# Patient Record
Sex: MALE | Race: WHITE | NOT HISPANIC OR LATINO | Employment: FULL TIME | URBAN - METROPOLITAN AREA
[De-identification: names, ages, dates, MRNs, and addresses within clinical notes are randomized per-mention and may not be internally consistent; named-entity substitution may affect disease eponyms.]

---

## 2017-01-25 ENCOUNTER — ALLSCRIPTS OFFICE VISIT (OUTPATIENT)
Dept: OTHER | Facility: OTHER | Age: 47
End: 2017-01-25

## 2017-01-25 DIAGNOSIS — E78.1 PURE HYPERGLYCERIDEMIA: ICD-10-CM

## 2018-01-12 VITALS
TEMPERATURE: 98.8 F | HEART RATE: 76 BPM | RESPIRATION RATE: 16 BRPM | WEIGHT: 228.38 LBS | SYSTOLIC BLOOD PRESSURE: 120 MMHG | DIASTOLIC BLOOD PRESSURE: 70 MMHG

## 2018-01-14 NOTE — RESULT NOTES
Verified Results  (1) LIPID PANEL, FASTING 03Eqf0100 03:58PM Sana Costa Order Number: [id number]     Test Name Result Flag Reference   CHOLESTEROL 242 mg/dL H    HDL,DIRECT 31 mg/dL L 40-60   Specimen collection should occur prior to Metamizole administration due to the potential for falsely depressed results  LDL CHOLESTEROL CALCULATED (Report)  0-100   Calculated LDL invalid, triglycerides >400 mg/dl  Performing Comments: OK IF NON FASTING  THIS IS NEEDED FOR PREOPERATIVE CLEARANCE  - Patient Instructions: This is a fasting blood test  Water,black tea or black coffee only after 9:00pm the night before test   Drink 2 glasses of water the morning of test     Performing Comments: OK IF NON FASTING  THIS IS NEEDED FOR PREOPERATIVE CLEARANCE  - Patient Instructions: This is a fasting blood test  Water,black tea or black coffee only after 9:00pm the night before test Drink 2 glasses of water the morning of   test   Triglyceride:       Normal       <150 mg/dl     Borderline High  150-199 mg/dl     High        200-499 mg/dl     Very High     >499 mg/dl  Cholesterol:       Desirable    <200 mg/dl    Borderline High 200-239 mg/dl    High       >239 mg/dl  HDL Cholesterol:      High  >59 mg/dL    Low   <41 mg/dL   Calculated LDL invalid, triglycerides >400 mg/dl  Performing Comments: OK IF NON FASTING  THIS IS NEEDED FOR PREOPERATIVE CLEARANCE   - Patient Instructions: This is a fasting blood test  Water,black tea or black  coffee only after 9:00pm the night before test   Drink 2 glasses of water the morning of test     Performing Comments: OK IF NON FASTING  THIS IS NEEDED FOR PREOPERATIVE CLEARANCE   - Patient Instructions:  This is a fasting blood test  Water,black tea or black  coffee only after 9:00pm the night before test Drink 2 glasses of water the morning of   test   Triglyceride:         Normal              <150 mg/dl       Borderline High    150-199 mg/dl       High               200-499 mg/dl       Very High          >499 mg/dl  Cholesterol:         Desirable        <200 mg/dl      Borderline High  200-239 mg/dl      High             >239 mg/dl  HDL Cholesterol:        High    >59 mg/dL      Low     <41 mg/dL   TRIGLYCERIDES 680 mg/dL H <=150   Specimen collection should occur prior to N-Acetylcysteine or Metamizole administration due to the potential for falsely depressed results

## 2018-02-02 ENCOUNTER — APPOINTMENT (OUTPATIENT)
Dept: RADIOLOGY | Facility: MEDICAL CENTER | Age: 48
End: 2018-02-02
Payer: COMMERCIAL

## 2018-02-02 ENCOUNTER — OFFICE VISIT (OUTPATIENT)
Dept: URGENT CARE | Facility: MEDICAL CENTER | Age: 48
End: 2018-02-02
Payer: COMMERCIAL

## 2018-02-02 ENCOUNTER — TRANSCRIBE ORDERS (OUTPATIENT)
Dept: ADMINISTRATIVE | Facility: HOSPITAL | Age: 48
End: 2018-02-02

## 2018-02-02 VITALS
TEMPERATURE: 99.2 F | WEIGHT: 207.38 LBS | DIASTOLIC BLOOD PRESSURE: 90 MMHG | RESPIRATION RATE: 20 BRPM | HEIGHT: 67 IN | OXYGEN SATURATION: 96 % | HEART RATE: 90 BPM | SYSTOLIC BLOOD PRESSURE: 160 MMHG | BODY MASS INDEX: 32.55 KG/M2

## 2018-02-02 DIAGNOSIS — J40 BRONCHITIS: Primary | ICD-10-CM

## 2018-02-02 DIAGNOSIS — R68.89 FLU-LIKE SYMPTOMS: Primary | ICD-10-CM

## 2018-02-02 PROCEDURE — 99213 OFFICE O/P EST LOW 20 MIN: CPT

## 2018-02-02 PROCEDURE — G0382 LEV 3 HOSP TYPE B ED VISIT: HCPCS

## 2018-02-02 PROCEDURE — 87798 DETECT AGENT NOS DNA AMP: CPT

## 2018-02-02 PROCEDURE — S9088 SERVICES PROVIDED IN URGENT: HCPCS

## 2018-02-02 PROCEDURE — 71046 X-RAY EXAM CHEST 2 VIEWS: CPT

## 2018-02-02 RX ORDER — OSELTAMIVIR PHOSPHATE 75 MG/1
75 CAPSULE ORAL 2 TIMES DAILY
Qty: 10 CAPSULE | Refills: 0 | Status: SHIPPED | OUTPATIENT
Start: 2018-02-02 | End: 2018-02-02 | Stop reason: SDUPTHER

## 2018-02-02 RX ORDER — OSELTAMIVIR PHOSPHATE 75 MG/1
75 CAPSULE ORAL 2 TIMES DAILY
Qty: 10 CAPSULE | Refills: 0 | Status: SHIPPED | OUTPATIENT
Start: 2018-02-02 | End: 2018-02-07

## 2018-02-02 NOTE — PROGRESS NOTES
On antibiotics for bronchitis today is last day for meds  Started yesterday with fever, nausea, feeling tired and achy  Temp yesterday was 101 at home  Taking homeopathic meds

## 2018-02-02 NOTE — PROGRESS NOTES
Assessment/Plan:      Diagnoses and all orders for this visit:    Flu-like symptoms          Subjective:     Patient ID: Diane Santos is a 41 y o  male  URI    This is a new problem  The current episode started yesterday  The problem has been unchanged  The maximum temperature recorded prior to his arrival was 100 4 - 100 9 F  Associated symptoms include chest pain, congestion, coughing, headaches, a plugged ear sensation, a rash, rhinorrhea and sinus pain  Pertinent negatives include no abdominal pain, diarrhea, dysuria, ear pain, nausea, sneezing, sore throat, swollen glands, vomiting or wheezing  Joint swelling: cough  He has tried nothing for the symptoms  The treatment provided mild relief  Review of Systems   Constitutional: Negative for chills, fatigue and fever  HENT: Positive for congestion, rhinorrhea and sinus pain  Negative for ear pain, hearing loss, postnasal drip, sinus pressure, sneezing and sore throat  Eyes: Negative for pain and discharge  Respiratory: Positive for cough  Negative for chest tightness, shortness of breath and wheezing  Cardiovascular: Positive for chest pain  Gastrointestinal: Negative for abdominal pain, constipation, diarrhea, nausea and vomiting  Genitourinary: Negative for difficulty urinating and dysuria  Musculoskeletal: Negative for arthralgias and myalgias  Skin: Positive for rash  Neurological: Positive for headaches  Negative for dizziness  Psychiatric/Behavioral: Negative for behavioral problems  Objective:  Vitals:    02/02/18 1128   BP: 160/90   Pulse: 90   Resp: 20   Temp: 99 2 °F (37 3 °C)   SpO2: 96%       X-RAY  Chest x-ray  I personally reviewed X-ray  No active pulmonary disease  Physical Exam   Constitutional: He is oriented to person, place, and time  He appears well-developed and well-nourished  HENT:   Right Ear: Tympanic membrane normal    Left Ear: Tympanic membrane normal    Neck: Normal range of motion   No edema present  Cardiovascular: Regular rhythm, S1 normal and S2 normal     Pulmonary/Chest: Effort normal and breath sounds normal  No respiratory distress  Lymphadenopathy:     He has no cervical adenopathy  Neurological: He is alert and oriented to person, place, and time  Skin: Skin is warm, dry and intact  No rash noted  Psychiatric: He has a normal mood and affect  His speech is normal and behavior is normal    Nursing note and vitals reviewed

## 2018-02-02 NOTE — PATIENT INSTRUCTIONS
Take tamiflu as directed  Increase fluid intake as directed  Follow up with PCP in 1-2 days  Go to the ER for worsening symptoms  Influenza   AMBULATORY CARE:   Influenza  (the flu) is an infection caused by the influenza virus  The flu is easily spread when an infected person coughs, sneezes, or has close contact with others  You may be able to spread the flu to others for 1 week or longer after signs or symptoms appear  Common signs and symptoms include the following:   · Fever and chills    · Headaches, body aches, and muscle or joint pain    · Cough, runny nose, and sore throat    · Loss of appetite, nausea, vomiting, or diarrhea    · Tiredness    · Trouble breathing  Call 911 for any of the following:   · You have trouble breathing, and your lips look purple or blue  · You have a seizure  Seek care immediately if:   · You are dizzy, or you are urinating less or not at all  · You have a headache with a stiff neck, and you feel tired or confused  · You have new pain or pressure in your chest     · Your symptoms, such as shortness of breath, vomiting, or diarrhea, get worse  · Your symptoms, such as fever and coughing, seem to get better, but then get worse  Contact your healthcare provider if:   · You have new muscle pain or weakness  · You have questions or concerns about your condition or care  Treatment for influenza  may include any of the following:  · Acetaminophen  decreases pain and fever  It is available without a doctor's order  Ask how much to take and how often to take it  Follow directions  Acetaminophen can cause liver damage if not taken correctly  · NSAIDs , such as ibuprofen, help decrease swelling, pain, and fever  This medicine is available with or without a doctor's order  NSAIDs can cause stomach bleeding or kidney problems in certain people  If you take blood thinner medicine, always ask your healthcare provider if NSAIDs are safe for you   Always read the medicine label and follow directions  · Antivirals  help fight a viral infection  Manage your symptoms:   · Rest  as much as you can to help you recover  · Drink liquids as directed  to help prevent dehydration  Ask how much liquid to drink each day and which liquids are best for you  Prevent the spread of the flu:   · Wash your hands often  Use soap and water  Wash your hands after you use the bathroom, change a child's diapers, or sneeze  Wash your hands before you prepare or eat food  Use gel hand cleanser when soap and water are not available  Do not touch your eyes, nose, or mouth unless you have washed your hands first            · Cover your mouth when you sneeze or cough  Cough into a tissue or the bend of your arm  · Clean shared items with a germ-killing   Clean table surfaces, doorknobs, and light switches  Do not share towels, silverware, and dishes with people who are sick  Wash bed sheets, towels, silverware, and dishes with soap and water  · Wear a mask  over your mouth and nose if you are sick or are near anyone who is sick  · Stay away from others  if you are sick  · Influenza vaccine  helps prevent influenza (flu)  Everyone older than 6 months should get a yearly influenza vaccine  Get the vaccine as soon as it is available, usually in September or October each year  Follow up with your healthcare provider as directed:  Write down your questions so you remember to ask them during your visits  © 2017 [employer] Information is for End User's use only and may not be sold, redistributed or otherwise used for commercial purposes  All illustrations and images included in CareNotes® are the copyrighted property of [employer]  or [employer]  The above information is an  only  It is not intended as medical advice for individual conditions or treatments   Talk to your doctor, nurse or pharmacist before following any medical regimen to see if it is safe and effective for you

## 2018-02-03 LAB
FLUAV AG SPEC QL: NORMAL
FLUBV AG SPEC QL: NORMAL
RSV B RNA SPEC QL NAA+PROBE: NORMAL

## 2018-02-06 ENCOUNTER — TRANSCRIBE ORDERS (OUTPATIENT)
Dept: ADMINISTRATIVE | Facility: HOSPITAL | Age: 48
End: 2018-02-06

## 2018-02-06 ENCOUNTER — APPOINTMENT (OUTPATIENT)
Dept: LAB | Facility: MEDICAL CENTER | Age: 48
End: 2018-02-06
Payer: COMMERCIAL

## 2018-02-06 ENCOUNTER — APPOINTMENT (OUTPATIENT)
Dept: RADIOLOGY | Facility: MEDICAL CENTER | Age: 48
End: 2018-02-06
Payer: COMMERCIAL

## 2018-02-06 DIAGNOSIS — R50.9 FEVER OF UNKNOWN ORIGIN: Primary | ICD-10-CM

## 2018-02-06 LAB
ALBUMIN SERPL BCP-MCNC: 4 G/DL (ref 3.5–5)
ALP SERPL-CCNC: 72 U/L (ref 46–116)
ALT SERPL W P-5'-P-CCNC: 35 U/L (ref 12–78)
ANION GAP SERPL CALCULATED.3IONS-SCNC: 5 MMOL/L (ref 4–13)
AST SERPL W P-5'-P-CCNC: 22 U/L (ref 5–45)
BACTERIA UR QL AUTO: ABNORMAL /HPF
BASOPHILS # BLD AUTO: 0.01 THOUSANDS/ΜL (ref 0–0.1)
BASOPHILS NFR BLD AUTO: 0 % (ref 0–1)
BILIRUB SERPL-MCNC: 0.63 MG/DL (ref 0.2–1)
BILIRUB UR QL STRIP: NEGATIVE
BUN SERPL-MCNC: 19 MG/DL (ref 5–25)
CALCIUM SERPL-MCNC: 9 MG/DL (ref 8.3–10.1)
CHLORIDE SERPL-SCNC: 105 MMOL/L (ref 100–108)
CLARITY UR: ABNORMAL
CO2 SERPL-SCNC: 29 MMOL/L (ref 21–32)
COLOR UR: ABNORMAL
CREAT SERPL-MCNC: 0.9 MG/DL (ref 0.6–1.3)
EOSINOPHIL # BLD AUTO: 0.14 THOUSAND/ΜL (ref 0–0.61)
EOSINOPHIL NFR BLD AUTO: 2 % (ref 0–6)
ERYTHROCYTE [DISTWIDTH] IN BLOOD BY AUTOMATED COUNT: 14.1 % (ref 11.6–15.1)
GFR SERPL CREATININE-BSD FRML MDRD: 103 ML/MIN/1.73SQ M
GLUCOSE SERPL-MCNC: 71 MG/DL (ref 65–140)
GLUCOSE UR STRIP-MCNC: NEGATIVE MG/DL
HCT VFR BLD AUTO: 48.3 % (ref 36.5–49.3)
HGB BLD-MCNC: 15.9 G/DL (ref 12–17)
HGB UR QL STRIP.AUTO: NEGATIVE
HYALINE CASTS #/AREA URNS LPF: ABNORMAL /LPF
KETONES UR STRIP-MCNC: NEGATIVE MG/DL
LEUKOCYTE ESTERASE UR QL STRIP: NEGATIVE
LYMPHOCYTES # BLD AUTO: 1.44 THOUSANDS/ΜL (ref 0.6–4.47)
LYMPHOCYTES NFR BLD AUTO: 22 % (ref 14–44)
MCH RBC QN AUTO: 29.3 PG (ref 26.8–34.3)
MCHC RBC AUTO-ENTMCNC: 32.9 G/DL (ref 31.4–37.4)
MCV RBC AUTO: 89 FL (ref 82–98)
MONOCYTES # BLD AUTO: 0.45 THOUSAND/ΜL (ref 0.17–1.22)
MONOCYTES NFR BLD AUTO: 7 % (ref 4–12)
NEUTROPHILS # BLD AUTO: 4.58 THOUSANDS/ΜL (ref 1.85–7.62)
NEUTS SEG NFR BLD AUTO: 69 % (ref 43–75)
NITRITE UR QL STRIP: NEGATIVE
NON-SQ EPI CELLS URNS QL MICRO: ABNORMAL /HPF
NRBC BLD AUTO-RTO: 0 /100 WBCS
PH UR STRIP.AUTO: 6 [PH] (ref 4.5–8)
PLATELET # BLD AUTO: 225 THOUSANDS/UL (ref 149–390)
PMV BLD AUTO: 10.9 FL (ref 8.9–12.7)
POTASSIUM SERPL-SCNC: 3.8 MMOL/L (ref 3.5–5.3)
PROT SERPL-MCNC: 8 G/DL (ref 6.4–8.2)
PROT UR STRIP-MCNC: ABNORMAL MG/DL
RBC # BLD AUTO: 5.42 MILLION/UL (ref 3.88–5.62)
RBC #/AREA URNS AUTO: ABNORMAL /HPF
SODIUM SERPL-SCNC: 139 MMOL/L (ref 136–145)
SP GR UR STRIP.AUTO: 1.03 (ref 1–1.03)
UROBILINOGEN UR QL STRIP.AUTO: 1 E.U./DL
WBC # BLD AUTO: 6.63 THOUSAND/UL (ref 4.31–10.16)
WBC #/AREA URNS AUTO: ABNORMAL /HPF

## 2018-02-06 PROCEDURE — 85025 COMPLETE CBC W/AUTO DIFF WBC: CPT | Performed by: FAMILY MEDICINE

## 2018-02-06 PROCEDURE — 87086 URINE CULTURE/COLONY COUNT: CPT | Performed by: FAMILY MEDICINE

## 2018-02-06 PROCEDURE — 81001 URINALYSIS AUTO W/SCOPE: CPT | Performed by: FAMILY MEDICINE

## 2018-02-06 PROCEDURE — 87040 BLOOD CULTURE FOR BACTERIA: CPT | Performed by: FAMILY MEDICINE

## 2018-02-06 PROCEDURE — 36415 COLL VENOUS BLD VENIPUNCTURE: CPT | Performed by: FAMILY MEDICINE

## 2018-02-06 PROCEDURE — 71046 X-RAY EXAM CHEST 2 VIEWS: CPT

## 2018-02-06 PROCEDURE — 80053 COMPREHEN METABOLIC PANEL: CPT | Performed by: FAMILY MEDICINE

## 2018-02-07 LAB — BACTERIA UR CULT: NORMAL

## 2018-02-12 LAB — BACTERIA BLD CULT: NORMAL

## 2019-09-17 ENCOUNTER — OFFICE VISIT (OUTPATIENT)
Dept: UROLOGY | Facility: AMBULATORY SURGERY CENTER | Age: 49
End: 2019-09-17
Payer: COMMERCIAL

## 2019-09-17 VITALS
SYSTOLIC BLOOD PRESSURE: 122 MMHG | WEIGHT: 215 LBS | HEIGHT: 67 IN | BODY MASS INDEX: 33.74 KG/M2 | DIASTOLIC BLOOD PRESSURE: 88 MMHG | HEART RATE: 58 BPM

## 2019-09-17 DIAGNOSIS — Z30.2 ENCOUNTER FOR STERILIZATION: Primary | ICD-10-CM

## 2019-09-17 PROCEDURE — 99244 OFF/OP CNSLTJ NEW/EST MOD 40: CPT | Performed by: UROLOGY

## 2019-09-17 RX ORDER — LORAZEPAM 2 MG/1
2 TABLET ORAL ONCE
Qty: 1 TABLET | Refills: 0 | Status: SHIPPED | OUTPATIENT
Start: 2019-09-17 | End: 2020-08-20

## 2019-09-17 RX ORDER — CEPHALEXIN 500 MG/1
1000 CAPSULE ORAL ONCE
Qty: 2 CAPSULE | Refills: 0 | Status: SHIPPED | OUTPATIENT
Start: 2019-09-17 | End: 2019-09-17

## 2019-09-17 RX ORDER — COAL TAR 20 %
SOLUTION, NON-ORAL TOPICAL
COMMUNITY
Start: 2017-01-25 | End: 2021-02-05 | Stop reason: ALTCHOICE

## 2019-09-17 RX ORDER — CLOBETASOL PROPIONATE 0.5 MG/G
CREAM TOPICAL
Refills: 6 | COMMUNITY
Start: 2019-07-24 | End: 2021-11-26 | Stop reason: SDUPTHER

## 2019-09-17 NOTE — ASSESSMENT & PLAN NOTE
Overall impression: Desires sterility    We discussed the risks of vasectomy including but not limited to infection, bleeding, damage to testicles, and possible continued fertility  We discussed that reversal is expensive, out-of-pocket and successful 80% of the time  The patient to proceed  Prescription for Ativan and Keflex given  Patient consented for procedure  He will need a ride home on the day of procedure  Procedure scheduled for the near future

## 2019-09-17 NOTE — PROGRESS NOTES
Assessment/Plan:    Encounter for sterilization    Overall impression: Desires sterility    We discussed the risks of vasectomy including but not limited to infection, bleeding, damage to testicles, and possible continued fertility  We discussed that reversal is expensive, out-of-pocket and successful 80% of the time  The patient to proceed  Prescription for Ativan and Keflex given  Patient consented for procedure  He will need a ride home on the day of procedure  Procedure scheduled for the near future  Diagnoses and all orders for this visit:    Encounter for sterilization  -     LORazepam (ATIVAN) 2 mg tablet; Take 1 tablet (2 mg total) by mouth once for 1 dose Take 1 hour prior to procedure  -     cephalexin (KEFLEX) 500 mg capsule; Take 2 capsules (1,000 mg total) by mouth once for 1 dose Take both pills 1 hour prior to procedure    Other orders  -     coal tar (LCD) 20 %; by Does not apply route  -     clobetasol (TEMOVATE) 0 05 % cream; APPLY THIN COAT TO AFFECTED AREA TWICE A DAY          Total visit time was 60 minutes of which over 50% was spent on counseling  Subjective:     Patient ID: Mai Crawley is a 57 y o  male    28-year-old male presents desiring sterility  He is  with 3 children  He denies any significant medical problems  He denies any allergies to medications  He denies any problems with coagulation  He has no other complaints  The following portions of the patient's history were reviewed and updated as appropriate: allergies, current medications, past family history, past medical history, past social history, past surgical history and problem list     Review of Systems   Constitutional: Negative  HENT: Negative  Eyes: Negative  Respiratory: Negative  Cardiovascular: Negative  Gastrointestinal: Negative  Endocrine: Negative  Genitourinary:        As noted per HPI   Musculoskeletal: Negative  Skin: Negative      Allergic/Immunologic: Negative  Neurological: Negative  Hematological: Negative  Psychiatric/Behavioral: Negative  Objective:    Physical Exam   Constitutional: He is oriented to person, place, and time  He appears well-developed and well-nourished  Neck: Normal range of motion  Cardiovascular: Intact distal pulses  Pulmonary/Chest: Effort normal    Abdominal: Soft  Bowel sounds are normal  He exhibits no distension and no mass  There is no tenderness  There is no rebound and no guarding  Genitourinary: Penis normal    Genitourinary Comments: Vasa easily palpable bilaterally   Musculoskeletal: Normal range of motion  Neurological: He is alert and oriented to person, place, and time  Skin: Skin is warm and dry  Psychiatric: He has a normal mood and affect  Vitals reviewed          Results  No results found for: PSA  Lab Results   Component Value Date    GLUCOSE 97 05/13/2014    CALCIUM 9 0 02/06/2018     05/13/2014    K 3 8 02/06/2018    CO2 29 02/06/2018     02/06/2018    BUN 19 02/06/2018    CREATININE 0 90 02/06/2018     Lab Results   Component Value Date    WBC 6 63 02/06/2018    HGB 15 9 02/06/2018    HCT 48 3 02/06/2018    MCV 89 02/06/2018     02/06/2018       No results found for this or any previous visit (from the past 1 hour(s)) ]

## 2019-11-14 ENCOUNTER — PROCEDURE VISIT (OUTPATIENT)
Dept: UROLOGY | Facility: AMBULATORY SURGERY CENTER | Age: 49
End: 2019-11-14
Payer: COMMERCIAL

## 2019-11-14 VITALS
BODY MASS INDEX: 33.74 KG/M2 | WEIGHT: 215 LBS | HEART RATE: 74 BPM | SYSTOLIC BLOOD PRESSURE: 154 MMHG | HEIGHT: 67 IN | DIASTOLIC BLOOD PRESSURE: 74 MMHG

## 2019-11-14 DIAGNOSIS — Z30.2 ENCOUNTER FOR STERILIZATION: Primary | ICD-10-CM

## 2019-11-14 PROCEDURE — 55250 REMOVAL OF SPERM DUCT(S): CPT | Performed by: UROLOGY

## 2019-11-14 PROCEDURE — 88302 TISSUE EXAM BY PATHOLOGIST: CPT | Performed by: PATHOLOGY

## 2019-11-14 RX ORDER — CEPHALEXIN 500 MG/1
CAPSULE ORAL
Refills: 0 | COMMUNITY
Start: 2019-11-08 | End: 2020-08-20

## 2019-11-14 RX ORDER — HYDROCODONE BITARTRATE AND ACETAMINOPHEN 5; 325 MG/1; MG/1
1 TABLET ORAL EVERY 4 HOURS PRN
Qty: 10 TABLET | Refills: 0 | Status: SHIPPED | OUTPATIENT
Start: 2019-11-14 | End: 2019-11-24

## 2019-11-14 NOTE — PROGRESS NOTES
Vasectomy     Date/Time 11/14/2019 2:36 PM     Performed by  Olamide Romo MD     Authorized by Olamide Romo MD          No Scalpel Vasectomy Procedure Note    Indications: 57 y o   y o  male desiring permanent sterilization    Pre-operative Diagnosis: Undesired fertility    Post-operative Diagnosis: Undesired fertility    Anesthesia: Lidocaine 2% without epinephrine      Procedure Details     The risks and benefits of the procedure were discussed at the pre-procedure consultation, and written, informed consent obtained  The patient took Ativan 2 mg and Keflex 60 minutes prior to procedure  The scrotum was palpated with both testes normal in size and position, no masses palpitated  The scrotum was cleansed with warm Betadine and draped in the usual sterile manner  2% lidocaine was instilled subcutaneously in the left hemiscrotum  After adequate anesthesia was established, a small perforation was made in the skin and the left vas was isolated with the ring forceps, dissected free and delivered through the skin perforation  The sheath of the vas was anesthetized with 2% lidocaine as well  The left vas was divided, approximately 1 5 cm portion removed, and each end of the vas was cauterized and suture ligated  The ends of the vas were replaced in the scrotum through the puncture site  The skin was reapproximated with an interrupted 3-0 chromic suture  Vasectomy was then performed on the right side in identical fashion  Midportions removed were sent to pathology to confirm  Good hemostasis was noted at the end of the procedure  Specimen:   1  Right vas deferens  2  Left vas deferens    Condition: Stable    Complications: none    Plan:        He did very well with the procedure today  Postprocedural instructions were provided  He will follow-up in 2-3 weeks for postoperative assessment   At that time we will coordinate his postprocedural semen analyses at 6 weeks, and again it 8 weeks after the procedure  He was instructed to call my office 2 weeks after his second specimen is submitted to review the results by phone  He was instructed to continue his current methods of contraception until that time

## 2019-11-26 NOTE — PROGRESS NOTES
11/29/2019  Piero Lopez is a 57 y o  male    Assessment/Plan  Wander Raja was seen today for post-op and vasectomy  Diagnoses and all orders for this visit:    Status post vasectomy  -     Semen analysis, post-vasectomy; Future  -     Semen analysis, post-vasectomy; Future        Discussion  Piero Lopez is a 57 y o  male being managed by Dr Benjamin Hadley  The patient is doing well with no complaints  He was provided verbal and written instructions regarding semen analysis testing  He was instructed to use contraception until sterility confirmed with 2 semen analysis  He will call to review results  He will follow up on an as needed basis  All questions were answered  History of Present Illness  57 y o  male s/p vasectomy (11/14/19), presents today for follow up  He denies any scrotal pain or swelling  He is doing well with no issues after surgery  Vitals  Vitals:    11/29/19 0925   BP: 142/88   BP Location: Left arm   Patient Position: Sitting   Cuff Size: Adult   Pulse: 68   Weight: 97 1 kg (214 lb)   Height: 5' 7" (1 702 m)       Current Medications  Current Outpatient Medications   Medication Sig Dispense Refill    clobetasol (TEMOVATE) 0 05 % cream APPLY THIN COAT TO AFFECTED AREA TWICE A DAY  6    cephalexin (KEFLEX) 500 mg capsule TAKE 2 CAPSULES BY MOUTH 1 HOUR PRIOR TO PROCEDURE  0    coal tar (LCD) 20 % by Does not apply route      LORazepam (ATIVAN) 2 mg tablet Take 1 tablet (2 mg total) by mouth once for 1 dose Take 1 hour prior to procedure  1 tablet 0     No current facility-administered medications for this visit  Review of Systems  Patient denies any gross hematuria, dysuria, or difficulty urinating      Physical Exam  Gu: scrotum midline/bilateral incisions c/d/i

## 2019-11-29 ENCOUNTER — OFFICE VISIT (OUTPATIENT)
Dept: UROLOGY | Facility: AMBULATORY SURGERY CENTER | Age: 49
End: 2019-11-29

## 2019-11-29 VITALS
WEIGHT: 214 LBS | DIASTOLIC BLOOD PRESSURE: 88 MMHG | HEART RATE: 68 BPM | BODY MASS INDEX: 33.59 KG/M2 | SYSTOLIC BLOOD PRESSURE: 142 MMHG | HEIGHT: 67 IN

## 2019-11-29 DIAGNOSIS — Z98.52 STATUS POST VASECTOMY: Primary | ICD-10-CM

## 2019-11-29 PROCEDURE — 99024 POSTOP FOLLOW-UP VISIT: CPT | Performed by: NURSE PRACTITIONER

## 2020-01-20 ENCOUNTER — APPOINTMENT (OUTPATIENT)
Dept: LAB | Facility: HOSPITAL | Age: 50
End: 2020-01-20
Payer: COMMERCIAL

## 2020-01-20 DIAGNOSIS — Z98.52 STATUS POST VASECTOMY: ICD-10-CM

## 2020-01-20 LAB
DEPRECATED CD4 CELLS/CD8 CELLS BLD: 1 ML
SPERM MOTILE SMN QL MICRO: NORMAL

## 2020-01-20 PROCEDURE — 89321 SEMEN ANAL SPERM DETECTION: CPT

## 2020-01-27 ENCOUNTER — TELEPHONE (OUTPATIENT)
Dept: UROLOGY | Facility: MEDICAL CENTER | Age: 50
End: 2020-01-27

## 2020-01-27 DIAGNOSIS — Z98.52 STATUS POST VASECTOMY: Primary | ICD-10-CM

## 2020-01-27 NOTE — TELEPHONE ENCOUNTER
Pt managed by [facility] calling for post vasectomy semen analysis results

## 2020-01-27 NOTE — TELEPHONE ENCOUNTER
Called and spoke with patient, aware we cannot consider him sterile until he has had 2 sperm analysis completed showing no mobile sperm  Patient only went for one test so far  Aware I have placed a new sperm analysis order in his chart that he can submit to the lab next week following the same protocol  Patient will then get a phone call after those results are in, to confirm if he is in fact considered sterile  All questions answered at this time

## 2020-02-07 ENCOUNTER — APPOINTMENT (OUTPATIENT)
Dept: LAB | Facility: HOSPITAL | Age: 50
End: 2020-02-07
Payer: COMMERCIAL

## 2020-02-07 DIAGNOSIS — Z98.52 STATUS POST VASECTOMY: ICD-10-CM

## 2020-02-07 LAB
DEPRECATED CD4 CELLS/CD8 CELLS BLD: 1.5 ML
SPERM MOTILE SMN QL MICRO: NORMAL

## 2020-02-07 PROCEDURE — 89321 SEMEN ANAL SPERM DETECTION: CPT

## 2020-02-17 NOTE — TELEPHONE ENCOUNTER
Called and left message per communication consent that patient is now considered sterile  Call office with any questions or concerns, number was left

## 2020-03-27 ENCOUNTER — TELEPHONE (OUTPATIENT)
Dept: DERMATOLOGY | Facility: CLINIC | Age: 50
End: 2020-03-27

## 2020-03-27 NOTE — TELEPHONE ENCOUNTER
Left msg for pt explaining he will need to contact medical records at the main hospital number to ask for his medical records

## 2020-07-13 ENCOUNTER — TELEPHONE (OUTPATIENT)
Dept: UROLOGY | Facility: AMBULATORY SURGERY CENTER | Age: 50
End: 2020-07-13

## 2020-07-13 NOTE — TELEPHONE ENCOUNTER
Call placed to patient, advised symptoms could be due to BPH  Patient recommended to schedule office visit to discuss  Patient scheduled

## 2020-07-13 NOTE — TELEPHONE ENCOUNTER
Report of urinary hesitancy likely secondary to underlying BPH  Patient can be scheduled for office visit to discuss

## 2020-07-13 NOTE — TELEPHONE ENCOUNTER
Spoke to patient directly and he stated that he only has hesitancy in the morning with urination  He voids well throughout the day  States he hydrates well  Denies any abdominal or penile pain  Denies any hematuria, no hematospermia noted, nor painful ejaculations  What do you suggest for him to help with having to "force" a m  Urination out?

## 2020-07-13 NOTE — TELEPHONE ENCOUNTER
Patient  has been having difficulty urinating, he feels he has to force himself to void  This has been occurring  for 4-6 weeks and only in the morning  Last time he was seen was for his VAS in November with Dr Trav Bland  He would like to speak to a nurse regarding this

## 2020-08-19 NOTE — TELEPHONE ENCOUNTER
Patient called requesting sooner appointment due to his voiding issues in am and lower kidney pain/ache      Patient scheduled for 08/20/20 with Dr Karina Hameed

## 2020-08-20 ENCOUNTER — OFFICE VISIT (OUTPATIENT)
Dept: UROLOGY | Facility: MEDICAL CENTER | Age: 50
End: 2020-08-20
Payer: COMMERCIAL

## 2020-08-20 VITALS
WEIGHT: 218 LBS | SYSTOLIC BLOOD PRESSURE: 140 MMHG | HEART RATE: 64 BPM | DIASTOLIC BLOOD PRESSURE: 100 MMHG | TEMPERATURE: 97.3 F | HEIGHT: 67 IN | BODY MASS INDEX: 34.21 KG/M2

## 2020-08-20 DIAGNOSIS — R39.16 STRAINING TO VOID: Primary | ICD-10-CM

## 2020-08-20 DIAGNOSIS — N41.8 OTHER PROSTATIC INFLAMMATORY DISEASES: ICD-10-CM

## 2020-08-20 PROBLEM — N41.9 PROSTATITIS: Status: ACTIVE | Noted: 2020-08-20

## 2020-08-20 LAB
POST-VOID RESIDUAL VOLUME, ML POC: 76 ML
SL AMB  POCT GLUCOSE, UA: NORMAL
SL AMB LEUKOCYTE ESTERASE,UA: NORMAL
SL AMB POCT BILIRUBIN,UA: NORMAL
SL AMB POCT BLOOD,UA: NORMAL
SL AMB POCT CLARITY,UA: CLEAR
SL AMB POCT COLOR,UA: YELLOW
SL AMB POCT KETONES,UA: NORMAL
SL AMB POCT NITRITE,UA: NORMAL
SL AMB POCT PH,UA: 7
SL AMB POCT SPECIFIC GRAVITY,UA: 1.02
SL AMB POCT URINE PROTEIN: NORMAL
SL AMB POCT UROBILINOGEN: 0.2

## 2020-08-20 PROCEDURE — 81003 URINALYSIS AUTO W/O SCOPE: CPT | Performed by: UROLOGY

## 2020-08-20 PROCEDURE — 51798 US URINE CAPACITY MEASURE: CPT | Performed by: UROLOGY

## 2020-08-20 PROCEDURE — 99203 OFFICE O/P NEW LOW 30 MIN: CPT | Performed by: UROLOGY

## 2020-08-20 RX ORDER — TAMSULOSIN HYDROCHLORIDE 0.4 MG/1
0.4 CAPSULE ORAL EVERY EVENING
Qty: 30 CAPSULE | Refills: 1 | Status: SHIPPED | OUTPATIENT
Start: 2020-08-20 | End: 2020-10-19

## 2020-08-20 NOTE — PROGRESS NOTES
Assessment/Plan:    Straining to void  The patient has symptoms of BPH, but he is very young for this problem and his physical exam does not support this diagnosis  We will give him tamsulosin to try to alleviate symptoms only investigate other diagnoses  Prostatitis  History supports this diagnosis put physical exam does not  He may still have a low-grade prostatitis causing his symptoms  We will get a semen culture to assess this possibility  In the meantime, he will take tamsulosin to alleviate symptoms  Diagnoses and all orders for this visit:    Straining to void  -     POCT urine dip auto non-scope  -     tamsulosin (FLOMAX) 0 4 mg; Take 1 capsule (0 4 mg total) by mouth every evening    Other prostatic inflammatory diseases  -     Semen culture; Future          Subjective:      Patient ID: Atul Corrales is a 54 y o  male  HPI  Difficulty voiding:  About 3 months ago, developed hesitancy, slower stream and need to strain to void  Double voids, especially in the morning  No dysuria or blood  Urine is cloudy at times  Nocturia zero to 2 times         Had transient ED when this began but that resolved after a few weeks and is now nl  No prior episodes  PVR:  76 CC  Getting nausea and headaches in the afternoons  The following portions of the patient's history were reviewed and updated as appropriate: allergies, current medications, past family history, past medical history, past social history, past surgical history and problem list     Review of Systems   Constitutional: Negative for activity change and fatigue  Respiratory: Negative for shortness of breath and wheezing  Cardiovascular: Negative for chest pain  Gastrointestinal: Negative for abdominal pain  Genitourinary: Negative for difficulty urinating, dysuria, frequency, hematuria and urgency  Musculoskeletal: Negative for back pain and gait problem  Skin: Negative  Allergic/Immunologic: Negative  Neurological: Negative  Psychiatric/Behavioral: Negative  Objective:      /100   Pulse 64   Temp (!) 97 3 °F (36 3 °C)   Ht 5' 7" (1 702 m)   Wt 98 9 kg (218 lb)   BMI 34 14 kg/m²          Physical Exam  Constitutional:       Appearance: He is well-developed  HENT:      Head: Normocephalic and atraumatic  Neck:      Musculoskeletal: Normal range of motion and neck supple  Pulmonary:      Effort: Pulmonary effort is normal    Genitourinary:     Rectum: Normal       Comments: The prostate is 20 gm, firm, smooth, non-tender  Musculoskeletal: Normal range of motion  Skin:     General: Skin is warm and dry  Neurological:      Mental Status: He is alert and oriented to person, place, and time  Psychiatric:         Behavior: Behavior normal          Thought Content:  Thought content normal          Judgment: Judgment normal

## 2020-08-20 NOTE — ASSESSMENT & PLAN NOTE
History supports this diagnosis put physical exam does not  He may still have a low-grade prostatitis causing his symptoms  We will get a semen culture to assess this possibility  In the meantime, he will take tamsulosin to alleviate symptoms

## 2020-08-20 NOTE — ASSESSMENT & PLAN NOTE
The patient has symptoms of BPH, but he is very young for this problem and his physical exam does not support this diagnosis  We will give him tamsulosin to try to alleviate symptoms only investigate other diagnoses

## 2020-08-20 NOTE — LETTER
August 20, 2020     Arielle Morales[address]   [address] [facility] [address]    Patient: Roddy Schaumann   YOB: 1970   Date of Visit: 8/20/2020       Dear Dr Simi Ann: Thank you for referring Roddy Schaumann to me for evaluation  Below are my notes for this consultation  If you have questions, please do not hesitate to call me  I look forward to following your patient along with you  Sincerely,        Alexandru Christopher MD        CC: No Recipients  Alexandru Christopher MD  8/20/2020 12:50 PM  Incomplete  Assessment/Plan:    Straining to void  The patient has symptoms of BPH, but he is very young for this problem and his physical exam does not support this diagnosis  We will give him tamsulosin to try to alleviate symptoms only investigate other diagnoses  Prostatitis  History supports this diagnosis put physical exam does not  He may still have a low-grade prostatitis causing his symptoms  We will get a semen culture to assess this possibility  In the meantime, he will take tamsulosin to alleviate symptoms  Diagnoses and all orders for this visit:    Straining to void  -     POCT urine dip auto non-scope  -     tamsulosin (FLOMAX) 0 4 mg; Take 1 capsule (0 4 mg total) by mouth every evening    Other prostatic inflammatory diseases  -     Semen culture; Future          Subjective:      Patient ID: Roddy Schaumann is a 54 y o  male  HPI  Difficulty voiding:  About 3 months ago, developed hesitancy, slower stream and need to strain to void  Double voids, especially in the morning  No dysuria or blood  Urine is cloudy at times  Nocturia zero to 2 times         Had transient ED when this began but that resolved after a few weeks and is now nl  No prior episodes  PVR:  76 CC  Getting nausea and headaches in the afternoons          The following portions of the patient's history were reviewed and updated as appropriate: allergies, current medications, past family history, past medical history, past social history, past surgical history and problem list     Review of Systems   Constitutional: Negative for activity change and fatigue  Respiratory: Negative for shortness of breath and wheezing  Cardiovascular: Negative for chest pain  Gastrointestinal: Negative for abdominal pain  Genitourinary: Negative for difficulty urinating, dysuria, frequency, hematuria and urgency  Musculoskeletal: Negative for back pain and gait problem  Skin: Negative  Allergic/Immunologic: Negative  Neurological: Negative  Psychiatric/Behavioral: Negative  Objective:      /100   Pulse 64   Temp (!) 97 3 °F (36 3 °C)   Ht 5' 7" (1 702 m)   Wt 98 9 kg (218 lb)   BMI 34 14 kg/m²          Physical Exam  Constitutional:       Appearance: He is well-developed  HENT:      Head: Normocephalic and atraumatic  Neck:      Musculoskeletal: Normal range of motion and neck supple  Pulmonary:      Effort: Pulmonary effort is normal    Genitourinary:     Rectum: Normal       Comments: The prostate is 20 gm, firm, smooth, non-tender  Musculoskeletal: Normal range of motion  Skin:     General: Skin is warm and dry  Neurological:      Mental Status: He is alert and oriented to person, place, and time  Psychiatric:         Behavior: Behavior normal          Thought Content: Thought content normal          Judgment: Judgment normal            Adriane Melton MD  8/20/2020  9:14 AM  Sign when Signing Visit  Assessment/Plan:    Straining to void  The patient has symptoms of BPH, but he is very young for this problem and his physical exam does not support this diagnosis  We will give him tamsulosin to try to alleviate symptoms only investigate other diagnoses  Prostatitis  History supports this diagnosis put physical exam does not  He may still have a low-grade prostatitis causing his symptoms  We will get a semen culture to assess this possibility    In the meantime, he will take tamsulosin to alleviate symptoms  Diagnoses and all orders for this visit:    Straining to void  -     POCT urine dip auto non-scope  -     tamsulosin (FLOMAX) 0 4 mg; Take 1 capsule (0 4 mg total) by mouth every evening    Other prostatic inflammatory diseases  -     Semen culture; Future          Subjective:      Patient ID: Poonam Eduardo is a 54 y o  male  HPI  Difficulty voiding:  About 3 months ago, developed hesitancy, slower stream and need to strain to void  Double voids, especially in the morning  No dysuria or blood  Urine is cloudy at times  Nocturia zero to 2 times         Had transient ED when this began but that resolved after a few weeks and is now nl  No prior episodes  PVR:  76 CC  Getting nausea and headaches in the afternoons  The following portions of the patient's history were reviewed and updated as appropriate: allergies, current medications, past family history, past medical history, past social history, past surgical history and problem list     Review of Systems   Constitutional: Negative for activity change and fatigue  Respiratory: Negative for shortness of breath and wheezing  Cardiovascular: Negative for chest pain  Gastrointestinal: Negative for abdominal pain  Genitourinary: Negative for difficulty urinating, dysuria, frequency, hematuria and urgency  Musculoskeletal: Negative for back pain and gait problem  Skin: Negative  Allergic/Immunologic: Negative  Neurological: Negative  Psychiatric/Behavioral: Negative  Objective:      /100   Pulse 64   Temp (!) 97 3 °F (36 3 °C)   Ht 5' 7" (1 702 m)   Wt 98 9 kg (218 lb)   BMI 34 14 kg/m²          Physical Exam  Constitutional:       Appearance: He is well-developed  HENT:      Head: Normocephalic and atraumatic  Neck:      Musculoskeletal: Normal range of motion and neck supple     Pulmonary:      Effort: Pulmonary effort is normal  Genitourinary:     Rectum: Normal       Comments: The prostate is 20 gm, firm, smooth, non-tender  Musculoskeletal: Normal range of motion  Skin:     General: Skin is warm and dry  Neurological:      Mental Status: He is alert and oriented to person, place, and time  Psychiatric:         Behavior: Behavior normal          Thought Content:  Thought content normal          Judgment: Judgment normal

## 2020-08-21 ENCOUNTER — APPOINTMENT (OUTPATIENT)
Dept: LAB | Facility: HOSPITAL | Age: 50
End: 2020-08-21
Attending: UROLOGY
Payer: COMMERCIAL

## 2020-08-21 DIAGNOSIS — N41.8 OTHER PROSTATIC INFLAMMATORY DISEASES: ICD-10-CM

## 2020-08-21 PROCEDURE — 87070 CULTURE OTHR SPECIMN AEROBIC: CPT

## 2020-08-21 PROCEDURE — 87186 SC STD MICRODIL/AGAR DIL: CPT

## 2020-08-21 PROCEDURE — 87147 CULTURE TYPE IMMUNOLOGIC: CPT

## 2020-08-21 PROCEDURE — 87077 CULTURE AEROBIC IDENTIFY: CPT

## 2020-08-23 LAB
BACTERIA SMN CULT: ABNORMAL
BACTERIA SMN CULT: ABNORMAL

## 2020-10-04 DIAGNOSIS — M25.561 CHRONIC PAIN OF RIGHT KNEE: Primary | ICD-10-CM

## 2020-10-04 DIAGNOSIS — G89.29 CHRONIC PAIN OF RIGHT KNEE: Primary | ICD-10-CM

## 2020-10-16 DIAGNOSIS — R39.16 STRAINING TO VOID: ICD-10-CM

## 2020-10-19 RX ORDER — TAMSULOSIN HYDROCHLORIDE 0.4 MG/1
CAPSULE ORAL
Qty: 30 CAPSULE | Refills: 1 | Status: SHIPPED | OUTPATIENT
Start: 2020-10-19 | End: 2020-11-23

## 2020-11-22 PROCEDURE — U0003 INFECTIOUS AGENT DETECTION BY NUCLEIC ACID (DNA OR RNA); SEVERE ACUTE RESPIRATORY SYNDROME CORONAVIRUS 2 (SARS-COV-2) (CORONAVIRUS DISEASE [COVID-19]), AMPLIFIED PROBE TECHNIQUE, MAKING USE OF HIGH THROUGHPUT TECHNOLOGIES AS DESCRIBED BY CMS-2020-01-R: HCPCS | Performed by: FAMILY MEDICINE

## 2020-11-23 DIAGNOSIS — R39.16 STRAINING TO VOID: ICD-10-CM

## 2020-11-23 RX ORDER — TAMSULOSIN HYDROCHLORIDE 0.4 MG/1
CAPSULE ORAL
Qty: 30 CAPSULE | Refills: 1 | Status: SHIPPED | OUTPATIENT
Start: 2020-11-23 | End: 2020-12-23

## 2020-12-23 DIAGNOSIS — R39.16 STRAINING TO VOID: ICD-10-CM

## 2020-12-23 RX ORDER — TAMSULOSIN HYDROCHLORIDE 0.4 MG/1
CAPSULE ORAL
Qty: 30 CAPSULE | Refills: 1 | Status: SHIPPED | OUTPATIENT
Start: 2020-12-23 | End: 2020-12-30

## 2020-12-30 DIAGNOSIS — R39.16 STRAINING TO VOID: ICD-10-CM

## 2020-12-30 RX ORDER — TAMSULOSIN HYDROCHLORIDE 0.4 MG/1
CAPSULE ORAL
Qty: 90 CAPSULE | Refills: 1 | Status: SHIPPED | OUTPATIENT
Start: 2020-12-30 | End: 2021-08-01 | Stop reason: SDUPTHER

## 2021-02-05 ENCOUNTER — OFFICE VISIT (OUTPATIENT)
Dept: FAMILY MEDICINE CLINIC | Facility: CLINIC | Age: 51
End: 2021-02-05
Payer: COMMERCIAL

## 2021-02-05 VITALS
SYSTOLIC BLOOD PRESSURE: 142 MMHG | HEART RATE: 58 BPM | OXYGEN SATURATION: 96 % | DIASTOLIC BLOOD PRESSURE: 90 MMHG | BODY MASS INDEX: 35.69 KG/M2 | TEMPERATURE: 98.4 F | HEIGHT: 67 IN | WEIGHT: 227.4 LBS

## 2021-02-05 DIAGNOSIS — E78.00 HYPERCHOLESTEROLEMIA: ICD-10-CM

## 2021-02-05 DIAGNOSIS — B37.9 YEAST INFECTION: ICD-10-CM

## 2021-02-05 DIAGNOSIS — Z00.00 ANNUAL PHYSICAL EXAM: Primary | ICD-10-CM

## 2021-02-05 DIAGNOSIS — B35.3 TINEA PEDIS OF BOTH FEET: ICD-10-CM

## 2021-02-05 PROBLEM — L40.9 PSORIASIS: Status: ACTIVE | Noted: 2017-01-25

## 2021-02-05 PROCEDURE — 1036F TOBACCO NON-USER: CPT | Performed by: NURSE PRACTITIONER

## 2021-02-05 PROCEDURE — 3008F BODY MASS INDEX DOCD: CPT | Performed by: NURSE PRACTITIONER

## 2021-02-05 PROCEDURE — 99386 PREV VISIT NEW AGE 40-64: CPT | Performed by: NURSE PRACTITIONER

## 2021-02-05 PROCEDURE — 3725F SCREEN DEPRESSION PERFORMED: CPT | Performed by: NURSE PRACTITIONER

## 2021-02-05 RX ORDER — FLUCONAZOLE 150 MG/1
150 TABLET ORAL ONCE
Qty: 1 TABLET | Refills: 0 | Status: SHIPPED | OUTPATIENT
Start: 2021-02-05 | End: 2021-02-05

## 2021-02-05 RX ORDER — KETOCONAZOLE 20 MG/G
CREAM TOPICAL DAILY
COMMUNITY
End: 2021-02-05 | Stop reason: SDUPTHER

## 2021-02-05 RX ORDER — KETOCONAZOLE 20 MG/G
CREAM TOPICAL DAILY
Qty: 15 G | Refills: 3 | Status: SHIPPED | OUTPATIENT
Start: 2021-02-05 | End: 2021-05-19

## 2021-02-05 NOTE — PROGRESS NOTES
[address]    NAME: Elyse Hall  AGE: 52 y o  SEX: male  : 1970     DATE: 2021     Assessment and Plan:     Problem List Items Addressed This Visit        Other    Hypercholesterolemia    Relevant Orders    Lipid panel      Other Visit Diagnoses     Annual physical exam    -  Primary    Relevant Orders    Comprehensive metabolic panel    HEMOGLOBIN A1C W/ EAG ESTIMATION    Tinea pedis of both feet        Relevant Medications    ketoconazole (NIZORAL) 2 % cream    Yeast infection        Relevant Orders    HEMOGLOBIN A1C W/ EAG ESTIMATION        Immunizations and preventive care screenings were discussed with patient today  Appropriate education was printed on patient's after visit summary  Counseling:  Alcohol/drug use: discussed moderation in alcohol intake, the recommendations for healthy alcohol use, and avoidance of illicit drug use  Dental Health: discussed importance of regular tooth brushing, flossing, and dental visits  Injury prevention: discussed safety/seat belts, safety helmets, smoke detectors, carbon dioxide detectors, and smoking near bedding or upholstery  Sexual health: discussed sexually transmitted diseases, partner selection, use of condoms, avoidance of unintended pregnancy, and contraceptive alternatives  · Exercise: the importance of regular exercise/physical activity was discussed  Recommend exercise 3-5 times per week for at least 30 minutes  BMI Counseling: Body mass index is 35 62 kg/m²  The BMI is above normal  Nutrition recommendations include decreasing portion sizes, encouraging healthy choices of fruits and vegetables, consuming healthier snacks, moderation in carbohydrate intake and increasing intake of lean protein  Exercise recommendations include exercising 3-5 times per week and strength training exercises  Return in about 1 year (around 2022)       Chief Complaint:     Chief Complaint   Patient presents with    Establish Care     possible yeast infection     Annual Exam      History of Present Illness:     Adult Annual Physical   Patient here to establish to the practice and for a comprehensive physical exam   Patient was previously being followed by a primary care provider [facility] but provider no longer takes patient's health insurance  Patient's wife has also stab list in this office as the patient  The patient reports family history of coronary artery disease and heart attacks at an early age  Patient also reports that he follows with the urologist for difficulty voiding and is currently on tamsulosin with relief of symptoms  Patient reports having an issue with athlete's foot that he has been treating with Ketoconazole cream and requesting refill  Patient has concerns that his wife and himself have been passing a yeast infection between themselves  In the past they have used holistic remedies which have not cleared the infection, so asking for other treatments that can be attempted  Diet and Physical Activity  · Diet/Nutrition: well balanced diet, consuming 3-5 servings of fruits/vegetables daily and vegetable based  · Exercise: no formal exercise  Depression Screening  PHQ-9 Depression Screening    PHQ-9:   Frequency of the following problems over the past two weeks:      Little interest or pleasure in doing things: 0 - not at all  Feeling down, depressed, or hopeless: 0 - not at all  PHQ-2 Score: 0       General Health  · Sleep: sleeps poorly, gets 4-6 hours of sleep on average and interrupped  · Hearing: normal - bilateral   · Vision: no vision problems, goes for regular eye exams and wears glasses  · Dental: regular dental visits, brushes teeth twice daily and twice a day flossing          Health  · Symptoms include: straining on urination     Review of Systems:     Review of Systems   Constitutional: Negative for activity change, appetite change and unexpected weight change  HENT: Negative for dental problem, ear pain, hearing loss, nosebleeds, sneezing, sore throat, tinnitus and trouble swallowing  Eyes: Negative for pain and visual disturbance  Respiratory: Negative for cough, chest tightness, shortness of breath and wheezing  Cardiovascular: Negative for chest pain, palpitations and leg swelling  Gastrointestinal: Negative for abdominal pain, constipation, diarrhea and nausea  Dietary sensitivities, wheat, soy, corn, apples   Endocrine: Negative for polydipsia, polyphagia and polyuria  Genitourinary: Negative  Musculoskeletal: Negative  Negative for arthralgias, back pain, myalgias and neck pain  Skin: Negative for color change and rash  Allergic/Immunologic: Positive for food allergies ( intolerance to wheat, corn, rice and )  Negative for environmental allergies  Neurological: Negative for dizziness, weakness, light-headedness and headaches  Hematological: Negative  Psychiatric/Behavioral: Negative  Negative for dysphoric mood and sleep disturbance  The patient is not nervous/anxious  Past Medical History:     History reviewed  No pertinent past medical history     Past Surgical History:     Past Surgical History:   Procedure Laterality Date    TOTAL HIP ARTHROPLASTY Right       Family History:     Family History   Problem Relation Age of Onset    Diabetes Mother     Heart attack Father     Diabetes Paternal Grandmother     Heart attack Paternal Grandfather       Social History:        Social History     Socioeconomic History    Marital status: /Civil Union     Spouse name: None    Number of children: None    Years of education: None    Highest education level: None   Occupational History    None   Social Needs    Financial resource strain: None    Food insecurity     Worry: None     Inability: None    Transportation needs     Medical: None     Non-medical: None   Tobacco Use    Smoking status: Former Smoker     Quit date:      Years since quittin 1    Smokeless tobacco: Never Used   Substance and Sexual Activity    Alcohol use: Yes     Comment: RARE    Drug use: Never    Sexual activity: None   Lifestyle    Physical activity     Days per week: None     Minutes per session: None    Stress: None   Relationships    Social connections     Talks on phone: None     Gets together: None     Attends Zoroastrian service: None     Active member of club or organization: None     Attends meetings of clubs or organizations: None     Relationship status: None    Intimate partner violence     Fear of current or ex partner: None     Emotionally abused: None     Physically abused: None     Forced sexual activity: None   Other Topics Concern    None   Social History Narrative    None      Current Medications:     Current Outpatient Medications   Medication Sig Dispense Refill    clobetasol (TEMOVATE) 0 05 % cream APPLY THIN COAT TO AFFECTED AREA TWICE A DAY  6    ketoconazole (NIZORAL) 2 % cream Apply topically daily 15 g 3    tamsulosin (FLOMAX) 0 4 mg TAKE 1 CAPSULE BY MOUTH EVERY EVENING 90 capsule 1     No current facility-administered medications for this visit  Allergies:     No Known Allergies   Physical Exam:     /90   Pulse 58   Temp 98 4 °F (36 9 °C)   Ht 5' 7" (1 702 m)   Wt 103 kg (227 lb 6 4 oz)   SpO2 96%   BMI 35 62 kg/m² (Reviewed)    Physical Exam  Vitals signs reviewed  Constitutional:       General: He is not in acute distress  Appearance: Normal appearance  He is well-developed, well-groomed and normal weight  He is not ill-appearing  HENT:      Head: Normocephalic and atraumatic        Right Ear: Tympanic membrane, ear canal and external ear normal       Left Ear: Tympanic membrane, ear canal and external ear normal       Nose: Nose normal       Comments: Patient had a facial covering in place as per office protocol Mouth/Throat:      Lips: Pink  Mouth: Mucous membranes are moist       Dentition: Normal dentition  Pharynx: Oropharynx is clear  Eyes:      General: Lids are normal       Extraocular Movements: Extraocular movements intact  Conjunctiva/sclera: Conjunctivae normal       Pupils: Pupils are equal, round, and reactive to light  Neck:      Musculoskeletal: Full passive range of motion without pain and neck supple  Thyroid: No thyromegaly  Trachea: Trachea normal    Cardiovascular:      Rate and Rhythm: Normal rate and regular rhythm  Pulses: Normal pulses  Radial pulses are 2+ on the right side and 2+ on the left side  Dorsalis pedis pulses are 2+ on the right side and 2+ on the left side  Posterior tibial pulses are 2+ on the right side and 2+ on the left side  Heart sounds: Normal heart sounds  No murmur  No friction rub  No gallop  Pulmonary:      Effort: Pulmonary effort is normal       Breath sounds: Normal breath sounds  Abdominal:      General: Abdomen is flat  Bowel sounds are normal       Palpations: Abdomen is soft  Tenderness: There is no abdominal tenderness  Hernia: No hernia is present  Musculoskeletal: Normal range of motion  Right lower leg: No edema  Left lower leg: No edema  Lymphadenopathy:      Cervical: No cervical adenopathy  Skin:     General: Skin is warm and dry  Capillary Refill: Capillary refill takes less than 2 seconds  Nails: There is no clubbing  Neurological:      General: No focal deficit present  Mental Status: He is alert and oriented to person, place, and time  Cranial Nerves: Cranial nerves are intact  Sensory: Sensation is intact  Motor: Motor function is intact  Coordination: Coordination is intact  Deep Tendon Reflexes: Reflexes are normal and symmetric     Psychiatric:         Mood and Affect: Mood normal          Speech: Speech normal  Behavior: Behavior normal  Behavior is cooperative            Joe

## 2021-02-05 NOTE — PATIENT INSTRUCTIONS

## 2021-03-31 DIAGNOSIS — B35.3 TINEA PEDIS OF BOTH FEET: Primary | ICD-10-CM

## 2021-03-31 RX ORDER — PRENATAL VIT 91/IRON/FOLIC/DHA 28-975-200
COMBINATION PACKAGE (EA) ORAL 2 TIMES DAILY
Qty: 30 G | Refills: 0 | Status: SHIPPED | OUTPATIENT
Start: 2021-03-31 | End: 2021-12-10 | Stop reason: ALTCHOICE

## 2021-04-30 ENCOUNTER — OFFICE VISIT (OUTPATIENT)
Dept: FAMILY MEDICINE CLINIC | Facility: CLINIC | Age: 51
End: 2021-04-30
Payer: COMMERCIAL

## 2021-04-30 VITALS
TEMPERATURE: 96.9 F | DIASTOLIC BLOOD PRESSURE: 86 MMHG | HEIGHT: 67 IN | OXYGEN SATURATION: 98 % | HEART RATE: 96 BPM | BODY MASS INDEX: 34.09 KG/M2 | WEIGHT: 217.2 LBS | SYSTOLIC BLOOD PRESSURE: 142 MMHG

## 2021-04-30 DIAGNOSIS — F41.9 ANXIETY: ICD-10-CM

## 2021-04-30 DIAGNOSIS — S61.210A LACERATION OF RIGHT INDEX FINGER WITHOUT FOREIGN BODY WITHOUT DAMAGE TO NAIL, INITIAL ENCOUNTER: ICD-10-CM

## 2021-04-30 DIAGNOSIS — R45.4 DIFFICULTY CONTROLLING ANGER: Primary | ICD-10-CM

## 2021-04-30 PROBLEM — Z30.2 ENCOUNTER FOR STERILIZATION: Status: RESOLVED | Noted: 2019-09-17 | Resolved: 2021-04-30

## 2021-04-30 PROBLEM — R39.16 STRAINING TO VOID: Status: RESOLVED | Noted: 2020-08-20 | Resolved: 2021-04-30

## 2021-04-30 PROBLEM — N41.9 PROSTATITIS: Status: RESOLVED | Noted: 2020-08-20 | Resolved: 2021-04-30

## 2021-04-30 PROBLEM — M72.2 PLANTAR FASCIAL FIBROMATOSIS: Status: ACTIVE | Noted: 2021-04-30

## 2021-04-30 PROCEDURE — 99214 OFFICE O/P EST MOD 30 MIN: CPT | Performed by: NURSE PRACTITIONER

## 2021-04-30 PROCEDURE — 3725F SCREEN DEPRESSION PERFORMED: CPT | Performed by: NURSE PRACTITIONER

## 2021-04-30 PROCEDURE — 3008F BODY MASS INDEX DOCD: CPT | Performed by: NURSE PRACTITIONER

## 2021-04-30 PROCEDURE — 1036F TOBACCO NON-USER: CPT | Performed by: NURSE PRACTITIONER

## 2021-04-30 PROCEDURE — 90714 TD VACC NO PRESV 7 YRS+ IM: CPT | Performed by: NURSE PRACTITIONER

## 2021-04-30 PROCEDURE — 90471 IMMUNIZATION ADMIN: CPT | Performed by: NURSE PRACTITIONER

## 2021-04-30 RX ORDER — CEPHALEXIN 500 MG/1
500 CAPSULE ORAL 3 TIMES DAILY
Qty: 21 CAPSULE | Refills: 0 | Status: SHIPPED | OUTPATIENT
Start: 2021-04-30 | End: 2021-05-07

## 2021-04-30 NOTE — PROGRESS NOTES
Assessment/Plan:     Diagnoses and all orders for this visit:    Difficulty controlling anger  -     sertraline (ZOLOFT) 50 mg tablet; Take 1 tablet (50 mg total) by mouth daily    Anxiety  -     sertraline (ZOLOFT) 50 mg tablet; Take 1 tablet (50 mg total) by mouth daily    Laceration of right index finger without foreign body without damage to nail, initial encounter  -     TD VACCINE GREATER THAN OR EQUAL TO 6YO PRESERVATIVE FREE IM  -     cephalexin (KEFLEX) 500 mg capsule; Take 1 capsule (500 mg total) by mouth 3 (three) times a day for 7 days    #1 Difficulty controlling anger  Discussed with patient plan to start him on sertraline to gain control of anger while treating underlying mental health issues  Discussed with patient need to return to counseling and offered this office's mental health counselor but patient declined  #2 Anxiety  Discussed with patient plan to start him on sertraline to gain control of underlying anxiety issues to gain control of anger  #3 Laceration of right index finger w/o foreign body w/o damage to nail, initial encounter  Discussed with patient plan to treat with 7 day course of cephalexin and updated tetanus vaccine  Patient instructed to return in one month or sooner if needed  Patient instructed to call for problems or concerns in the interim    Subjective:      Patient ID: Jacob Collins is a 52 y o  male  52 y o male presenting to discuss treatment for anxiety and anger issues  Patient also lacerated his right index finger with an utility knife yesterday at work  It as not been over twenty-four since the laceration occurred and the bleeding is controlled since bandaging  Patient reports that he cleaned out the wound with Hibiclens at time of accident and applied some triple antibiotic ointment  The edges are well approximated and no foreign objects noted in laceration so sutures will not be necessary   Patient reports that he as had anger issues for a long time and sought counseling for it  He is no longer in counseling but he does report looking for another counselor  He states that his anger was one time related to physical outburst but currently the outburst have been limited to verbal  He as three small children and his wife told him he needed to seek help due to the frequency and intensity of the anger outbursts  He is unable to identify triggers of the anger but increased anxiety seems to be a motive for the anger to surface         Family History   Problem Relation Age of Onset    Diabetes Mother     Heart attack Father     Diabetes Paternal Grandmother     Heart attack Paternal Grandfather      Social History     Socioeconomic History    Marital status: /Civil Union     Spouse name: Not on file    Number of children: Not on file    Years of education: Not on file    Highest education level: Not on file   Occupational History    Not on file   Social Needs    Financial resource strain: Not on file    Food insecurity     Worry: Not on file     Inability: Not on file   [employer] needs     Medical: Not on file     Non-medical: Not on file   Tobacco Use    Smoking status: Former Smoker     Quit date:      Years since quittin 3    Smokeless tobacco: Never Used   Substance and Sexual Activity    Alcohol use: Yes     Comment: RARE    Drug use: Never    Sexual activity: Not on file   Lifestyle    Physical activity     Days per week: Not on file     Minutes per session: Not on file    Stress: Not on file   Relationships    Social connections     Talks on phone: Not on file     Gets together: Not on file     Attends Sabianist service: Not on file     Active member of club or organization: Not on file     Attends meetings of clubs or organizations: Not on file     Relationship status: Not on file    Intimate partner violence     Fear of current or ex partner: Not on file     Emotionally abused: Not on file     Physically abused: Not on file Forced sexual activity: Not on file   Other Topics Concern    Not on file   Social History Narrative    Not on file     E-Cigarette/Vaping     E-Cigarette/Vaping Substances     History reviewed  No pertinent past medical history  Past Surgical History:   Procedure Laterality Date    TOTAL HIP ARTHROPLASTY Right      No Known Allergies    Current Outpatient Medications:     clobetasol (TEMOVATE) 0 05 % cream, APPLY THIN COAT TO AFFECTED AREA TWICE A DAY, Disp: , Rfl: 6    ketoconazole (NIZORAL) 2 % cream, Apply topically daily, Disp: 15 g, Rfl: 3    tamsulosin (FLOMAX) 0 4 mg, TAKE 1 CAPSULE BY MOUTH EVERY EVENING, Disp: 90 capsule, Rfl: 1    terbinafine (LamISIL) 1 % cream, Apply topically 2 (two) times a day, Disp: 30 g, Rfl: 0    cephalexin (KEFLEX) 500 mg capsule, Take 1 capsule (500 mg total) by mouth 3 (three) times a day for 7 days, Disp: 21 capsule, Rfl: 0    sertraline (ZOLOFT) 50 mg tablet, Take 1 tablet (50 mg total) by mouth daily, Disp: 30 tablet, Rfl: 2    Review of Systems   Constitutional: Negative for activity change, appetite change and unexpected weight change  HENT: Negative  Eyes: Negative  Respiratory: Negative  Cardiovascular: Negative  Gastrointestinal: Negative  Endocrine: Negative  Genitourinary: Negative  Musculoskeletal: Negative  Skin: Positive for wound  Allergic/Immunologic: Negative  Neurological: Negative for dizziness, weakness, light-headedness and headaches  Hematological: Negative  Psychiatric/Behavioral: Positive for behavioral problems and dysphoric mood  The patient is nervous/anxious  Objective:    /86   Pulse 96   Temp (!) 96 9 °F (36 1 °C)   Ht 5' 7" (1 702 m)   Wt 98 5 kg (217 lb 3 2 oz)   SpO2 98%   BMI 34 02 kg/m² (Reviewed)     Physical Exam  Vitals signs reviewed  Constitutional:       General: He is not in acute distress  Appearance: Normal appearance  He is well-developed and well-groomed   He is not ill-appearing  HENT:      Head: Normocephalic and atraumatic  Right Ear: External ear normal       Left Ear: External ear normal       Nose:      Comments: Patient had a facial covering in place as per office protocol  Eyes:      General: Lids are normal       Extraocular Movements: Extraocular movements intact  Conjunctiva/sclera: Conjunctivae normal       Pupils: Pupils are equal, round, and reactive to light  Neck:      Musculoskeletal: Neck supple  Trachea: Trachea normal    Cardiovascular:      Rate and Rhythm: Normal rate and regular rhythm  Pulses:           Radial pulses are 2+ on the right side and 2+ on the left side  Heart sounds: Normal heart sounds  No murmur  Pulmonary:      Effort: Pulmonary effort is normal       Breath sounds: Normal breath sounds  Musculoskeletal:      Right lower leg: No edema  Left lower leg: No edema  Skin:     General: Skin is warm and dry  Capillary Refill: Capillary refill takes less than 2 seconds  Findings: Laceration ( right index finger tip) present  Neurological:      General: No focal deficit present  Mental Status: He is alert and oriented to person, place, and time  Motor: Motor function is intact  Psychiatric:         Mood and Affect: Mood is anxious  Affect is not angry  Behavior: Behavior normal  Behavior is cooperative  Thought Content: Thought content normal              Depression Screening and Follow-up Plan: Patient's depression screening was positive with a PHQ-2 score of 4  Their PHQ-9 score was 16  Patient assessed for underlying major depression  Brief counseling provided and recommend additional follow-up/re-evaluation next office visit  Patient advised to follow-up with PCP for further management

## 2021-05-18 DIAGNOSIS — B35.3 TINEA PEDIS OF BOTH FEET: ICD-10-CM

## 2021-05-19 RX ORDER — KETOCONAZOLE 20 MG/G
CREAM TOPICAL
Qty: 30 G | Refills: 1 | Status: SHIPPED | OUTPATIENT
Start: 2021-05-19

## 2021-06-11 ENCOUNTER — OFFICE VISIT (OUTPATIENT)
Dept: FAMILY MEDICINE CLINIC | Facility: CLINIC | Age: 51
End: 2021-06-11
Payer: COMMERCIAL

## 2021-06-11 VITALS
SYSTOLIC BLOOD PRESSURE: 144 MMHG | WEIGHT: 216 LBS | HEIGHT: 67 IN | DIASTOLIC BLOOD PRESSURE: 88 MMHG | TEMPERATURE: 97.9 F | BODY MASS INDEX: 33.9 KG/M2 | OXYGEN SATURATION: 97 % | HEART RATE: 65 BPM

## 2021-06-11 DIAGNOSIS — F41.9 ANXIETY: Primary | ICD-10-CM

## 2021-06-11 DIAGNOSIS — H00.012 HORDEOLUM EXTERNUM OF RIGHT LOWER EYELID: ICD-10-CM

## 2021-06-11 PROCEDURE — 99214 OFFICE O/P EST MOD 30 MIN: CPT | Performed by: NURSE PRACTITIONER

## 2021-06-11 PROCEDURE — 1036F TOBACCO NON-USER: CPT | Performed by: NURSE PRACTITIONER

## 2021-06-11 PROCEDURE — 3008F BODY MASS INDEX DOCD: CPT | Performed by: NURSE PRACTITIONER

## 2021-06-11 PROCEDURE — 3725F SCREEN DEPRESSION PERFORMED: CPT | Performed by: NURSE PRACTITIONER

## 2021-06-11 RX ORDER — POLYMYXIN B SULFATE AND TRIMETHOPRIM 1; 10000 MG/ML; [USP'U]/ML
1 SOLUTION OPHTHALMIC EVERY 4 HOURS
Qty: 10 ML | Refills: 0 | Status: SHIPPED | OUTPATIENT
Start: 2021-06-11 | End: 2021-06-16

## 2021-06-11 RX ORDER — SERTRALINE HYDROCHLORIDE 25 MG/1
25 TABLET, FILM COATED ORAL DAILY
Qty: 30 TABLET | Refills: 5 | Status: SHIPPED | OUTPATIENT
Start: 2021-06-11 | End: 2021-08-02 | Stop reason: DRUGHIGH

## 2021-06-11 NOTE — PROGRESS NOTES
Assessment/Plan:     Diagnoses and all orders for this visit:    Anxiety  -     sertraline (ZOLOFT) 25 mg tablet; Take 1 tablet (25 mg total) by mouth daily    Hordeolum externum of right lower eyelid  -     polymyxin b-trimethoprim (POLYTRIM) ophthalmic solution; Administer 1 drop to the right eye every 4 (four) hours for 5 days    #1 Anxiety  Discussed with patient plan to continue on sertraline 50 mg in evening and add on 25 my in morning to obtain 24 hour management  #2 Hordeolum externum of right lower eyelid  Discussed with patient plan to treat with 5 days of Polytrim eye drops every 4 hours while awake  Discussed with patient conservative measures: warm compress usage, cold tea bag compress and clean surrounding area with gentle soap to remove oils  Patient instructed to call if no improvement in 72 hours or symptoms worsen    Subjective:      Patient ID: Lenny Davenport is a 52 y o  male  52 y o male presenting for one month follow up since being started on sertraline for anxiety and anger control  Patient reports that these sertraline 50 mg he has been taking at night and it does seem to control his anger and anxiety well but he does seem to notice that in the mid morning he seems to have a little bit more anxiety  He did initially have some problems with erectile dysfunction 1 starting on the sertraline but those issues have resolved  Two days ago patient noticed that he was developing a stye in his right lower eye  He was using warm compresses but the stye increased in size so he did drain it last night for some yellow is mucous  Where he trained to stye he now has a small ulcerated area pain  Patient denies fever chills or general body aches being associated with the development of the stye         Family History   Problem Relation Age of Onset    Diabetes Mother     Heart attack Father     Diabetes Paternal Grandmother     Heart attack Paternal Grandfather      Social History     Socioeconomic History    Marital status: /Civil Union     Spouse name: Not on file    Number of children: Not on file    Years of education: Not on file    Highest education level: Not on file   Occupational History    Not on file   Social Needs    Financial resource strain: Not on file    Food insecurity     Worry: Not on file     Inability: Not on file    Transportation needs     Medical: Not on file     Non-medical: Not on file   Tobacco Use    Smoking status: Former Smoker     Quit date:      Years since quittin 4    Smokeless tobacco: Never Used   Substance and Sexual Activity    Alcohol use: Yes     Comment: RARE    Drug use: Never    Sexual activity: Not on file   Lifestyle    Physical activity     Days per week: Not on file     Minutes per session: Not on file    Stress: Not on file   Relationships    Social connections     Talks on phone: Not on file     Gets together: Not on file     Attends Jain service: Not on file     Active member of club or organization: Not on file     Attends meetings of clubs or organizations: Not on file     Relationship status: Not on file    Intimate partner violence     Fear of current or ex partner: Not on file     Emotionally abused: Not on file     Physically abused: Not on file     Forced sexual activity: Not on file   Other Topics Concern    Not on file   Social History Narrative    Not on file     E-Cigarette/Vaping     E-Cigarette/Vaping Substances     History reviewed  No pertinent past medical history    Past Surgical History:   Procedure Laterality Date    TOTAL HIP ARTHROPLASTY Right      No Known Allergies    Current Outpatient Medications:     clobetasol (TEMOVATE) 0 05 % cream, APPLY THIN COAT TO AFFECTED AREA TWICE A DAY, Disp: , Rfl: 6    ketoconazole (NIZORAL) 2 % cream, APPLY TO AFFECTED AREA EVERY DAY, Disp: 30 g, Rfl: 1    sertraline (ZOLOFT) 50 mg tablet, Take 1 tablet (50 mg total) by mouth daily, Disp: 30 tablet, Rfl: 2   tamsulosin (FLOMAX) 0 4 mg, TAKE 1 CAPSULE BY MOUTH EVERY EVENING, Disp: 90 capsule, Rfl: 1    terbinafine (LamISIL) 1 % cream, Apply topically 2 (two) times a day, Disp: 30 g, Rfl: 0    polymyxin b-trimethoprim (POLYTRIM) ophthalmic solution, Administer 1 drop to the right eye every 4 (four) hours for 5 days, Disp: 10 mL, Rfl: 0    sertraline (ZOLOFT) 25 mg tablet, Take 1 tablet (25 mg total) by mouth daily, Disp: 30 tablet, Rfl: 5    Review of Systems   Constitutional: Negative  Eyes: Positive for pain, discharge and redness  Negative for photophobia and visual disturbance  Respiratory: Negative  Cardiovascular: Negative  Gastrointestinal: Negative  Musculoskeletal: Negative  Skin: Negative  Neurological: Negative  Psychiatric/Behavioral: Positive for agitation  The patient is nervous/anxious  Objective:    /88   Pulse 65   Temp 97 9 °F (36 6 °C)   Ht 5' 7" (1 702 m)   Wt 98 kg (216 lb)   SpO2 97%   BMI 33 83 kg/m² (Reviewed)     Physical Exam  Vitals signs reviewed  Constitutional:       General: He is not in acute distress  Appearance: He is well-developed and well-groomed  He is not ill-appearing  HENT:      Head: Normocephalic and atraumatic  Right Ear: External ear normal       Left Ear: External ear normal       Nose:      Comments: Patient had a facial covering in place as per office protocol  Eyes:      General: Lids are normal       Extraocular Movements: Extraocular movements intact  Conjunctiva/sclera: Conjunctivae normal       Pupils: Pupils are equal, round, and reactive to light  Neck:      Musculoskeletal: Neck supple  Cardiovascular:      Rate and Rhythm: Normal rate and regular rhythm  Heart sounds: Normal heart sounds  Pulmonary:      Effort: Pulmonary effort is normal       Breath sounds: Normal breath sounds  Skin:     General: Skin is warm and dry  Neurological:      General: No focal deficit present        Mental Status: He is alert and oriented to person, place, and time  Psychiatric:         Mood and Affect: Mood normal          Behavior: Behavior normal  Behavior is cooperative  Thought Content: Thought content normal          FAVIOLA-7 Flowsheet Screening      Most Recent Value   Over the last two weeks, how often have you been bothered by the following problems? Feeling nervous, anxious, or on edge  1   Not being able to stop or control worrying  1   Worrying too much about different things  1   Trouble relaxing   1   Being so restless that it's hard to sit still  0   Becoming easily annoyed or irritable   1   Feeling afraid as if something awful might happen  0   How difficult have these problems made it for you to do your work, take care of things at home, or get along with other people?    Somewhat difficult   FAVIOLA Score   5

## 2021-07-28 ENCOUNTER — OFFICE VISIT (OUTPATIENT)
Dept: GASTROENTEROLOGY | Facility: CLINIC | Age: 51
End: 2021-07-28
Payer: COMMERCIAL

## 2021-07-28 VITALS
WEIGHT: 215.8 LBS | SYSTOLIC BLOOD PRESSURE: 130 MMHG | DIASTOLIC BLOOD PRESSURE: 78 MMHG | BODY MASS INDEX: 32.71 KG/M2 | HEIGHT: 68 IN | HEART RATE: 68 BPM

## 2021-07-28 DIAGNOSIS — R10.30 LOWER ABDOMINAL PAIN: ICD-10-CM

## 2021-07-28 DIAGNOSIS — K21.9 GASTROESOPHAGEAL REFLUX DISEASE, UNSPECIFIED WHETHER ESOPHAGITIS PRESENT: ICD-10-CM

## 2021-07-28 DIAGNOSIS — R19.7 DIARRHEA, UNSPECIFIED TYPE: Primary | ICD-10-CM

## 2021-07-28 PROCEDURE — 99204 OFFICE O/P NEW MOD 45 MIN: CPT | Performed by: PHYSICIAN ASSISTANT

## 2021-07-28 PROCEDURE — 1036F TOBACCO NON-USER: CPT | Performed by: PHYSICIAN ASSISTANT

## 2021-07-28 PROCEDURE — 3008F BODY MASS INDEX DOCD: CPT | Performed by: PHYSICIAN ASSISTANT

## 2021-07-28 RX ORDER — DICYCLOMINE HCL 20 MG
20 TABLET ORAL EVERY 6 HOURS PRN
Qty: 30 TABLET | Refills: 2 | Status: SHIPPED | OUTPATIENT
Start: 2021-07-28 | End: 2022-06-10 | Stop reason: ALTCHOICE

## 2021-07-28 RX ORDER — PANTOPRAZOLE SODIUM 40 MG/1
40 TABLET, DELAYED RELEASE ORAL DAILY
Qty: 30 TABLET | Refills: 1 | Status: SHIPPED | OUTPATIENT
Start: 2021-07-28 | End: 2021-08-20

## 2021-08-01 DIAGNOSIS — R39.16 STRAINING TO VOID: ICD-10-CM

## 2021-08-01 DIAGNOSIS — R45.4 DIFFICULTY CONTROLLING ANGER: ICD-10-CM

## 2021-08-01 DIAGNOSIS — F41.9 ANXIETY: ICD-10-CM

## 2021-08-01 NOTE — PROGRESS NOTES
[facility]  Margret Menendez 52 y o  male MRN: [id number]       CC: New patient to establish for chronic GI symptoms    HPI: Venkatesh Echeverria is a 52year old male with history of HLD and anxiety  Patient is here to establish with GI for acute on chronic GI symptoms  Patient reports long history of intermittent diarrhea  Patient reports increased fecal urgency and lower abdominal cramping  He has noticed that foods such as apples, wheat, soy and corn tend to trigger his symptoms  Lately, he is having more consistent diarrhea  He had his COVID vaccine on July 15th, and feels it may have increased since then  He denies recent antibiotics or travel  No sick contacts  He denies family history of IBD or colon cancer to his knowledge  No signs of overt GIB  In addition to his lower GI symptoms, he has new acid reflux and vomiting  Patient reports episodes that have occurred during sleep  He denies dysphagia or odynophagia  No previous EGD or colonoscopy  Review of Systems:    CONSTITUTIONAL: Denies any fever, chills, or rigors  Good appetite, and no recent weight loss  HEENT: No earache or tinnitus  Denies hearing loss or visual disturbances  CARDIOVASCULAR: No chest pain or palpitations  RESPIRATORY: Denies any cough, hemoptysis, shortness of breath or dyspnea on exertion  GASTROINTESTINAL: As noted in the History of Present Illness  GENITOURINARY: No problems with urination  Denies any hematuria or dysuria  NEUROLOGIC: No dizziness or vertigo, denies headaches  MUSCULOSKELETAL: Denies any muscle or joint pain  SKIN: Denies skin rashes or itching  ENDOCRINE: Denies excessive thirst  Denies intolerance to heat or cold  PSYCHOSOCIAL: Denies depression or anxiety  Denies any recent memory loss         Current Outpatient Medications   Medication Sig Dispense Refill    clobetasol (TEMOVATE) 0 05 % cream APPLY THIN COAT TO AFFECTED AREA TWICE A DAY  6    ketoconazole (NIZORAL) 2 % cream APPLY TO AFFECTED AREA EVERY DAY 30 g 1    sertraline (ZOLOFT) 50 mg tablet Take 1 tablet (50 mg total) by mouth daily 30 tablet 2    tamsulosin (FLOMAX) 0 4 mg TAKE 1 CAPSULE BY MOUTH EVERY EVENING 90 capsule 1    dicyclomine (BENTYL) 20 mg tablet Take 1 tablet (20 mg total) by mouth every 6 (six) hours as needed (abdominal cramping) 30 tablet 2    pantoprazole (PROTONIX) 40 mg tablet Take 1 tablet (40 mg total) by mouth daily 30 tablet 1    sertraline (ZOLOFT) 25 mg tablet Take 1 tablet (25 mg total) by mouth daily (Patient not taking: Reported on 2021) 30 tablet 5    terbinafine (LamISIL) 1 % cream Apply topically 2 (two) times a day (Patient not taking: Reported on 2021) 30 g 0     No current facility-administered medications for this visit  History reviewed  No pertinent past medical history  Past Surgical History:   Procedure Laterality Date    TOTAL HIP ARTHROPLASTY Right      Social History     Socioeconomic History    Marital status: /Civil Union     Spouse name: None    Number of children: None    Years of education: None    Highest education level: None   Occupational History    None   Tobacco Use    Smoking status: Former Smoker     Quit date:      Years since quittin 5    Smokeless tobacco: Never Used   Vaping Use    Vaping Use: Some days    Substances: THC, CBD   Substance and Sexual Activity    Alcohol use: Yes     Comment: RARE    Drug use: Yes     Types: Marijuana     Comment: medical marijuana    Sexual activity: None   Other Topics Concern    None   Social History Narrative    None     Social Determinants of Health     Financial Resource Strain:     Difficulty of Paying Living Expenses:    Food Insecurity:     Worried About Running Out of Food in the Last Year:     Ran Out of Food in the Last Year:    Transportation Needs:     Lack of Transportation (Medical):      Lack of Transportation (Non-Medical):    Physical Activity:     Days of Exercise per Week:     Minutes of Exercise per Session:    Stress:     Feeling of Stress :    Social Connections:     Frequency of Communication with Friends and Family:     Frequency of Social Gatherings with Friends and Family:     Attends Scientology Services:     Active Member of Clubs or Organizations:     Attends Club or Organization Meetings:     Marital Status:    Intimate Partner Violence:     Fear of Current or Ex-Partner:     Emotionally Abused:     Physically Abused:     Sexually Abused:      Family History   Problem Relation Age of Onset    Diabetes Mother     Heart attack Father     Diabetes Paternal Grandmother     Heart attack Paternal Grandfather             PHYSICAL EXAM:    Vitals:    07/28/21 1534   BP: 130/78   Pulse: 68   Weight: 97 9 kg (215 lb 12 8 oz)   Height: 5' 8" (1 727 m)     General Appearance:   Alert and oriented x 3  Cooperative, and in no respiratory distress   HEENT:   Normocephalic, atraumatic, anicteric      Neck:  Supple, symmetrical, trachea midline   Lungs:   Clear to auscultation bilaterally    Heart[de-identified]   Regular rate and rhythm   Abdomen:   Soft, non-tender, non-distended; normal bowel sounds; no masses, no organomegaly    Genitalia:   Deferred    Rectal:   Deferred    Extremities:  No cyanosis, clubbing or edema    Pulses:  2+ and symmetric all extremities    Skin:  Skin color, texture, turgor normal, no rashes or lesions    Lymph nodes:  No palpable cervical or supraclavicular lymphadenopathy        Lab Results:             Invalid input(s): LABALBU            Imaging Studies: I have personally reviewed pertinent imaging studies  No results found  ASSESSMENT and PLAN:      1) Nausea, GERD - EGD to investigate, and start PPI course  He has no RUQ pain to suggest biliary etiology at this time  But, could consider RUQ U/S +/- GES in the future  2) Acute on chronic diarrhea, lower abdominal cramping - No family history  Symptoms worsening recently   No nocturnal diarrhea  - Colonoscopy to investigate  - Fecal calprotectin and infectious stool studies prior to above  - Start Bentyl  - If colonoscopy and fecal calportectin negative, recommend Xiafxan course        Follow up after testing

## 2021-08-02 ENCOUNTER — TELEPHONE (OUTPATIENT)
Dept: GASTROENTEROLOGY | Facility: CLINIC | Age: 51
End: 2021-08-02

## 2021-08-02 DIAGNOSIS — F41.9 ANXIETY: ICD-10-CM

## 2021-08-02 DIAGNOSIS — R45.4 DIFFICULTY CONTROLLING ANGER: ICD-10-CM

## 2021-08-02 RX ORDER — TAMSULOSIN HYDROCHLORIDE 0.4 MG/1
0.4 CAPSULE ORAL EVERY EVENING
Qty: 90 CAPSULE | Refills: 0 | Status: SHIPPED | OUTPATIENT
Start: 2021-08-02 | End: 2021-11-08

## 2021-08-19 DIAGNOSIS — K21.9 GASTROESOPHAGEAL REFLUX DISEASE, UNSPECIFIED WHETHER ESOPHAGITIS PRESENT: ICD-10-CM

## 2021-08-20 RX ORDER — PANTOPRAZOLE SODIUM 40 MG/1
TABLET, DELAYED RELEASE ORAL
Qty: 30 TABLET | Refills: 1 | Status: SHIPPED | OUTPATIENT
Start: 2021-08-20 | End: 2021-09-09

## 2021-08-21 ENCOUNTER — APPOINTMENT (OUTPATIENT)
Dept: LAB | Age: 51
End: 2021-08-21
Payer: COMMERCIAL

## 2021-08-21 DIAGNOSIS — R10.30 LOWER ABDOMINAL PAIN: ICD-10-CM

## 2021-08-21 DIAGNOSIS — R19.7 DIARRHEA, UNSPECIFIED TYPE: ICD-10-CM

## 2021-08-21 LAB — C DIFF TOX B TCDB STL QL NAA+PROBE: NEGATIVE

## 2021-08-21 PROCEDURE — 87505 NFCT AGENT DETECTION GI: CPT

## 2021-08-21 PROCEDURE — 83993 ASSAY FOR CALPROTECTIN FECAL: CPT

## 2021-08-21 PROCEDURE — 87493 C DIFF AMPLIFIED PROBE: CPT

## 2021-08-22 LAB
CAMPYLOBACTER DNA SPEC NAA+PROBE: NORMAL
SALMONELLA DNA SPEC QL NAA+PROBE: NORMAL
SHIGA TOXIN STX GENE SPEC NAA+PROBE: NORMAL
SHIGELLA DNA SPEC QL NAA+PROBE: NORMAL

## 2021-08-23 ENCOUNTER — TELEPHONE (OUTPATIENT)
Dept: GASTROENTEROLOGY | Facility: CLINIC | Age: 51
End: 2021-08-23

## 2021-08-23 NOTE — TELEPHONE ENCOUNTER
----- Message from Suzie Zhou PA-C sent at 8/23/2021  1:24 PM EDT -----  Please let patient know infectious testing negative, waiting for stool inflammation test to result  Thank you!

## 2021-08-25 ENCOUNTER — TELEPHONE (OUTPATIENT)
Dept: GASTROENTEROLOGY | Facility: CLINIC | Age: 51
End: 2021-08-25

## 2021-08-25 LAB — CALPROTECTIN STL-MCNT: 30 UG/G (ref 0–120)

## 2021-08-25 NOTE — TELEPHONE ENCOUNTER
----- Message from Yehuda Gann PA-C sent at 8/25/2021  8:11 AM EDT -----  Please let patient know his stool test for inflammation is normal, we will do colonoscopy to confirm diagnosis

## 2021-09-05 DIAGNOSIS — K21.9 GASTROESOPHAGEAL REFLUX DISEASE, UNSPECIFIED WHETHER ESOPHAGITIS PRESENT: ICD-10-CM

## 2021-09-09 RX ORDER — PANTOPRAZOLE SODIUM 40 MG/1
TABLET, DELAYED RELEASE ORAL
Qty: 90 TABLET | Refills: 1 | Status: SHIPPED | OUTPATIENT
Start: 2021-09-09 | End: 2022-04-02

## 2021-09-30 ENCOUNTER — TELEPHONE (OUTPATIENT)
Dept: GASTROENTEROLOGY | Facility: CLINIC | Age: 51
End: 2021-09-30

## 2021-09-30 NOTE — TELEPHONE ENCOUNTER
Marlene Yen patient - Patient called to cancel his procedure for colonoscopy/ndo for tomorrow and would like to reschedule    Sherice

## 2021-09-30 NOTE — TELEPHONE ENCOUNTER
Patient didn't have a procedure on the scheduled  Tried to call number and the mailbox is full and could not accept messages at this time

## 2021-10-26 DIAGNOSIS — R39.16 STRAINING TO VOID: ICD-10-CM

## 2021-11-08 RX ORDER — TAMSULOSIN HYDROCHLORIDE 0.4 MG/1
CAPSULE ORAL
Qty: 90 CAPSULE | Refills: 0 | Status: SHIPPED | OUTPATIENT
Start: 2021-11-08 | End: 2022-01-28

## 2021-11-26 DIAGNOSIS — B37.9 YEAST INFECTION: Primary | ICD-10-CM

## 2021-11-26 RX ORDER — CLOBETASOL PROPIONATE 0.5 MG/G
CREAM TOPICAL 2 TIMES DAILY
Qty: 30 G | Refills: 6 | Status: SHIPPED | OUTPATIENT
Start: 2021-11-26 | End: 2021-11-29 | Stop reason: SDUPTHER

## 2021-11-29 RX ORDER — CLOBETASOL PROPIONATE 0.5 MG/G
CREAM TOPICAL 2 TIMES DAILY
Qty: 30 G | Refills: 6 | Status: SHIPPED | OUTPATIENT
Start: 2021-11-29 | End: 2021-12-10 | Stop reason: SDUPTHER

## 2021-12-10 ENCOUNTER — OFFICE VISIT (OUTPATIENT)
Dept: FAMILY MEDICINE CLINIC | Facility: CLINIC | Age: 51
End: 2021-12-10
Payer: COMMERCIAL

## 2021-12-10 VITALS
OXYGEN SATURATION: 97 % | SYSTOLIC BLOOD PRESSURE: 140 MMHG | TEMPERATURE: 98.3 F | BODY MASS INDEX: 34.37 KG/M2 | WEIGHT: 226.8 LBS | DIASTOLIC BLOOD PRESSURE: 108 MMHG | HEIGHT: 68 IN | HEART RATE: 63 BPM

## 2021-12-10 DIAGNOSIS — L40.9 PSORIASIS: ICD-10-CM

## 2021-12-10 DIAGNOSIS — H93.13 TINNITUS OF BOTH EARS: ICD-10-CM

## 2021-12-10 DIAGNOSIS — F33.9 DEPRESSION, RECURRENT (HCC): Primary | ICD-10-CM

## 2021-12-10 PROBLEM — B37.9 YEAST INFECTION: Status: RESOLVED | Noted: 2021-12-10 | Resolved: 2021-12-10

## 2021-12-10 PROBLEM — B37.9 YEAST INFECTION: Status: ACTIVE | Noted: 2021-12-10

## 2021-12-10 PROCEDURE — 1036F TOBACCO NON-USER: CPT | Performed by: NURSE PRACTITIONER

## 2021-12-10 PROCEDURE — 99214 OFFICE O/P EST MOD 30 MIN: CPT | Performed by: NURSE PRACTITIONER

## 2021-12-10 PROCEDURE — 3008F BODY MASS INDEX DOCD: CPT | Performed by: NURSE PRACTITIONER

## 2021-12-10 RX ORDER — AMOXICILLIN 500 MG/1
CAPSULE ORAL
COMMUNITY
Start: 2021-12-09 | End: 2022-06-10 | Stop reason: ALTCHOICE

## 2021-12-10 RX ORDER — HYDROCODONE BITARTRATE AND ACETAMINOPHEN 5; 325 MG/1; MG/1
TABLET ORAL
COMMUNITY
Start: 2021-12-09 | End: 2021-12-10 | Stop reason: ALTCHOICE

## 2021-12-10 RX ORDER — CLOBETASOL PROPIONATE 0.5 MG/G
CREAM TOPICAL 2 TIMES DAILY
Qty: 30 G | Refills: 6 | Status: SHIPPED | OUTPATIENT
Start: 2021-12-10

## 2022-01-28 DIAGNOSIS — R39.16 STRAINING TO VOID: ICD-10-CM

## 2022-01-28 RX ORDER — TAMSULOSIN HYDROCHLORIDE 0.4 MG/1
CAPSULE ORAL
Qty: 90 CAPSULE | Refills: 0 | Status: SHIPPED | OUTPATIENT
Start: 2022-01-28 | End: 2022-05-02

## 2022-02-04 ENCOUNTER — NURSE TRIAGE (OUTPATIENT)
Dept: OTHER | Facility: OTHER | Age: 52
End: 2022-02-04

## 2022-02-04 DIAGNOSIS — Z20.822 ENCOUNTER FOR SCREENING LABORATORY TESTING FOR COVID-19 VIRUS: Primary | ICD-10-CM

## 2022-02-04 PROCEDURE — U0005 INFEC AGEN DETEC AMPLI PROBE: HCPCS | Performed by: NURSE PRACTITIONER

## 2022-02-04 PROCEDURE — U0003 INFECTIOUS AGENT DETECTION BY NUCLEIC ACID (DNA OR RNA); SEVERE ACUTE RESPIRATORY SYNDROME CORONAVIRUS 2 (SARS-COV-2) (CORONAVIRUS DISEASE [COVID-19]), AMPLIFIED PROBE TECHNIQUE, MAKING USE OF HIGH THROUGHPUT TECHNOLOGIES AS DESCRIBED BY CMS-2020-01-R: HCPCS | Performed by: NURSE PRACTITIONER

## 2022-02-04 NOTE — TELEPHONE ENCOUNTER
Patient is requesting a covid test and does  have a [facility]'s PCP  Order placed  Patient informed of [address] [facility] testing site and was advised of hours of operation, address, to wear a mask, and to stay in the car  Patient verbalized understanding  Patient already has a My Chart account to check for results  Advised patient to begin checking in 2-3 days after testing is completed  Reason for Disposition   [1] COVID-19 infection suspected by caller or triager AND [2] mild symptoms (cough, fever, or others) AND [3] has not gotten tested yet    Answer Assessment - Initial Assessment Questions  Were you within 6 feet or less, for up to 15 minutes or more with a person that has a confirmed COVID-19 test? Yes, family member    What was the date of your exposure? 1/29/22    Are you experiencing any symptoms attributed to the virus?  (Assess for SOB, cough, fever, difficulty breathing) Chest congestion, nasal congestion, cough, fatigue, diarrhea, chills, sore throat resolved, runny nose, headaches, body aches    HIGH RISK: Do you have any history heart or lung conditions, weakened immune system, diabetes, Asthma, CHF, HIV, COPD, Chemo, renal failure, sickle cell, etc? Denies    PREGNANCY: Are you pregnant or did you recently give birth?  N/a    VACCINE: "Have you gotten the COVID-19 vaccine?" If Yes ask: "Which one, how many shots, when did you get it?" Yes, moderna x 2 shots    Protocols used: CORONAVIRUS (COVID-19) DIAGNOSED OR SUSPECTED-ADULT-[place]

## 2022-02-04 NOTE — TELEPHONE ENCOUNTER
Regarding: covid symptomatic-[facility]  congestion, fatigue, headache  2 of 2  ----- Message from Kailee Drake sent at 2/4/2022  2:03 PM EST -----  "I have some congestion, fatigue, headache, and tightness in my chest   Our family was exposed last weekend "

## 2022-04-01 DIAGNOSIS — K21.9 GASTROESOPHAGEAL REFLUX DISEASE, UNSPECIFIED WHETHER ESOPHAGITIS PRESENT: ICD-10-CM

## 2022-04-02 RX ORDER — PANTOPRAZOLE SODIUM 40 MG/1
TABLET, DELAYED RELEASE ORAL
Qty: 30 TABLET | Refills: 5 | Status: SHIPPED | OUTPATIENT
Start: 2022-04-02

## 2022-04-30 DIAGNOSIS — R39.16 STRAINING TO VOID: ICD-10-CM

## 2022-05-02 RX ORDER — TAMSULOSIN HYDROCHLORIDE 0.4 MG/1
CAPSULE ORAL
Qty: 90 CAPSULE | Refills: 0 | Status: SHIPPED | OUTPATIENT
Start: 2022-05-02 | End: 2022-08-01

## 2022-06-10 ENCOUNTER — OFFICE VISIT (OUTPATIENT)
Dept: FAMILY MEDICINE CLINIC | Facility: CLINIC | Age: 52
End: 2022-06-10
Payer: COMMERCIAL

## 2022-06-10 VITALS
DIASTOLIC BLOOD PRESSURE: 78 MMHG | BODY MASS INDEX: 33.8 KG/M2 | TEMPERATURE: 98.5 F | HEIGHT: 68 IN | HEART RATE: 66 BPM | WEIGHT: 223 LBS | SYSTOLIC BLOOD PRESSURE: 128 MMHG

## 2022-06-10 DIAGNOSIS — K58.9 IRRITABLE BOWEL SYNDROME, UNSPECIFIED TYPE: ICD-10-CM

## 2022-06-10 DIAGNOSIS — Z00.00 ANNUAL PHYSICAL EXAM: Primary | ICD-10-CM

## 2022-06-10 DIAGNOSIS — E78.00 HYPERCHOLESTEROLEMIA: ICD-10-CM

## 2022-06-10 PROCEDURE — 3008F BODY MASS INDEX DOCD: CPT | Performed by: NURSE PRACTITIONER

## 2022-06-10 PROCEDURE — 1036F TOBACCO NON-USER: CPT | Performed by: NURSE PRACTITIONER

## 2022-06-10 PROCEDURE — 99396 PREV VISIT EST AGE 40-64: CPT | Performed by: NURSE PRACTITIONER

## 2022-06-10 PROCEDURE — 3725F SCREEN DEPRESSION PERFORMED: CPT | Performed by: NURSE PRACTITIONER

## 2022-06-10 RX ORDER — DICYCLOMINE HCL 20 MG
20 TABLET ORAL EVERY 6 HOURS PRN
Qty: 30 TABLET | Refills: 2
Start: 2022-06-10

## 2022-06-10 RX ORDER — DICYCLOMINE HCL 20 MG
20 TABLET ORAL EVERY 6 HOURS PRN
Qty: 30 TABLET | Refills: 2 | Status: SHIPPED | OUTPATIENT
Start: 2022-06-10 | End: 2022-06-10

## 2022-06-10 NOTE — PROGRESS NOTES
[address]    NAME: Oneyda Billing  AGE: 54 y o  SEX: male  : 1970     DATE: 6/10/2022     Assessment and Plan:     Problem List Items Addressed This Visit        Other    Hypercholesterolemia    Relevant Orders    Comprehensive metabolic panel    Lipid panel      Other Visit Diagnoses     Annual physical exam    -  Primary    Irritable bowel syndrome, unspecified type        Relevant Medications    dicyclomine (BENTYL) 20 mg tablet        Immunizations and preventive care screenings were discussed with patient today  Appropriate education was printed on patient's after visit summary  Counseling:  Alcohol/drug use: discussed moderation in alcohol intake, the recommendations for healthy alcohol use, and avoidance of illicit drug use  Dental Health: discussed importance of regular tooth brushing, flossing, and dental visits  Injury prevention: discussed safety/seat belts, safety helmets, smoke detectors, carbon dioxide detectors, and smoking near bedding or upholstery  Exercise: the importance of regular exercise/physical activity was discussed  Recommend exercise 3-5 times per week for at least 30 minutes  BMI Counseling: Body mass index is 33 91 kg/m²  The BMI is above normal  Nutrition recommendations include decreasing portion sizes, encouraging healthy choices of fruits and vegetables, consuming healthier snacks, moderation in carbohydrate intake and increasing intake of lean protein  Exercise recommendations include exercising 3-5 times per week and strength training exercises  Rationale for BMI follow-up plan is due to patient being overweight or obese  Depression Screening and Follow-up Plan: Patient assessed for underlying major depression  Brief counseling provided and recommend additional follow-up/re-evaluation next office visit  Return in about 6 months (around 12/10/2022)       Chief Complaint: Chief Complaint   Patient presents with    Follow-up    Physical Exam      History of Present Illness:     Adult Annual Physical   Patient here for a comprehensive physical exam  The patient reports no problems  Diet and Physical Activity  Diet/Nutrition: well balanced diet and consuming 3-5 servings of fruits/vegetables daily  Exercise: no formal exercise  Depression Screening  PHQ-2/9 Depression Screening    Little interest or pleasure in doing things: 0 - not at all  Feeling down, depressed, or hopeless: 0 - not at all  Trouble falling or staying asleep, or sleeping too much: 0 - not at all  Feeling tired or having little energy: 0 - not at all  Poor appetite or overeatin - not at all  Feeling bad about yourself - or that you are a failure or have let yourself or your family down: 0 - not at all  Trouble concentrating on things, such as reading the newspaper or watching television: 0 - not at all  Moving or speaking so slowly that other people could have noticed  Or the opposite - being so fidgety or restless that you have been moving around a lot more than usual: 0 - not at all  Thoughts that you would be better off dead, or of hurting yourself in some way: 0 - not at all  PHQ-9 Score: 0   PHQ-9 Interpretation: No or Minimal depression        General Health  Sleep: sleeps well and gets 7-8 hours of sleep on average  Hearing: normal - bilateral   Vision: no vision problems, goes for regular eye exams and wears glasses  Dental: regular dental visits and brushes teeth twice daily   Health  Symptoms include: none     Review of Systems:     Review of Systems   Constitutional: Negative for activity change, appetite change and unexpected weight change  HENT: Negative for dental problem, ear pain, hearing loss, nosebleeds, sneezing, sore throat, tinnitus and trouble swallowing  Eyes: Negative for visual disturbance     Respiratory: Negative for cough, chest tightness, shortness of breath and wheezing  Cardiovascular: Negative for chest pain, palpitations and leg swelling  Gastrointestinal: Positive for abdominal pain ( intermittent cramping)  Negative for constipation, diarrhea and nausea  Endocrine: Negative for polydipsia and polyuria  Genitourinary: Negative  Strain on urination if not using prescribed medication   Musculoskeletal: Negative for arthralgias, back pain, myalgias and neck pain  Skin: Negative for color change and rash  Allergic/Immunologic: Negative for environmental allergies  Neurological: Negative for dizziness, weakness, light-headedness and headaches  Hematological: Negative  Psychiatric/Behavioral: Negative  Negative for dysphoric mood and sleep disturbance  The patient is not nervous/anxious  Past Medical History:     History reviewed  No pertinent past medical history     Past Surgical History:     Past Surgical History:   Procedure Laterality Date    TOTAL HIP ARTHROPLASTY Right       Family History:     Family History   Problem Relation Age of Onset    Diabetes Mother     Heart attack Father     Diabetes Paternal Grandmother     Heart attack Paternal Grandfather       Social History:     Social History     Socioeconomic History    Marital status: /Civil Union     Spouse name: None    Number of children: None    Years of education: None    Highest education level: None   Occupational History    None   Tobacco Use    Smoking status: Former Smoker     Quit date:      Years since quittin 4    Smokeless tobacco: Never Used   Vaping Use    Vaping Use: Some days    Substances: THC, CBD   Substance and Sexual Activity    Alcohol use: Yes     Comment: RARE    Drug use: Yes     Types: Marijuana     Comment: medical marijuana    Sexual activity: None   Other Topics Concern    None   Social History Narrative    None     Social Determinants of Health     Financial Resource Strain: Not on file   Food Insecurity: Not on file   Transportation Needs: Not on file   Physical Activity: Not on file   Stress: Not on file   Social Connections: Not on file   Intimate Partner Violence: Not on file   Housing Stability: Not on file      Current Medications:     Current Outpatient Medications   Medication Sig Dispense Refill    clobetasol (TEMOVATE) 0 05 % cream Apply topically 2 (two) times a day 30 g 6    dicyclomine (BENTYL) 20 mg tablet Take 1 tablet (20 mg total) by mouth every 6 (six) hours as needed (abdominal cramping) 30 tablet 2    ketoconazole (NIZORAL) 2 % cream APPLY TO AFFECTED AREA EVERY DAY 30 g 1    pantoprazole (PROTONIX) 40 mg tablet TAKE 1 TABLET BY MOUTH EVERY DAY 30 tablet 5    sertraline (ZOLOFT) 50 mg tablet TAKE 1 TABLET BY MOUTH EVERY DAY 90 tablet 3    tamsulosin (FLOMAX) 0 4 mg TAKE 1 CAPSULE BY MOUTH EVERY EVENING 90 capsule 0     No current facility-administered medications for this visit  Allergies: Allergies   Allergen Reactions    Soybean Oil - Food Allergy GI Intolerance    Sweet Corn (Diagnostic) - Food Allergy GI Intolerance    Wheat Bran - Food Allergy GI Intolerance      Physical Exam:     /78   Pulse 66   Temp 98 5 °F (36 9 °C)   Ht 5' 8" (1 727 m)   Wt 101 kg (223 lb)   BMI 33 91 kg/m² (Reviewed)    Physical Exam  Vitals reviewed  Constitutional:       General: He is not in acute distress  Appearance: Normal appearance  He is well-developed, well-groomed and normal weight  He is not ill-appearing  HENT:      Head: Normocephalic and atraumatic  Right Ear: Tympanic membrane, ear canal and external ear normal       Left Ear: Tympanic membrane, ear canal and external ear normal       Nose: Nose normal       Mouth/Throat:      Lips: Pink  Mouth: Mucous membranes are moist       Dentition: Normal dentition  Pharynx: Oropharynx is clear  Eyes:      General: Lids are normal       Extraocular Movements: Extraocular movements intact  Conjunctiva/sclera: Conjunctivae normal       Pupils: Pupils are equal, round, and reactive to light  Neck:      Thyroid: No thyromegaly  Trachea: Trachea normal    Cardiovascular:      Rate and Rhythm: Normal rate and regular rhythm  Pulses: Normal pulses  Radial pulses are 2+ on the right side and 2+ on the left side  Dorsalis pedis pulses are 2+ on the right side and 2+ on the left side  Posterior tibial pulses are 2+ on the right side and 2+ on the left side  Heart sounds: Normal heart sounds  No murmur heard  Pulmonary:      Effort: Pulmonary effort is normal       Breath sounds: Normal breath sounds  Abdominal:      General: Abdomen is flat  Bowel sounds are normal       Palpations: Abdomen is soft  Tenderness: There is no abdominal tenderness  Hernia: No hernia is present  Musculoskeletal:         General: Normal range of motion  Cervical back: Full passive range of motion without pain and neck supple  Right lower leg: No edema  Left lower leg: No edema  Lymphadenopathy:      Cervical: No cervical adenopathy  Skin:     General: Skin is warm and dry  Capillary Refill: Capillary refill takes less than 2 seconds  Nails: There is no clubbing  Neurological:      Mental Status: He is alert and oriented to person, place, and time  Cranial Nerves: Cranial nerves are intact  Sensory: Sensation is intact  Motor: Motor function is intact  Coordination: Coordination is intact  Deep Tendon Reflexes: Reflexes are normal and symmetric  Psychiatric:         Mood and Affect: Mood normal          Speech: Speech normal          Behavior: Behavior normal  Behavior is cooperative            Joe

## 2022-06-10 NOTE — PATIENT INSTRUCTIONS

## 2022-06-10 NOTE — PROGRESS NOTES
Assessment/Plan:     Diagnoses and all orders for this visit:    Annual physical exam    Irritable bowel syndrome, unspecified type  -     Discontinue: dicyclomine (BENTYL) 20 mg tablet; Take 1 tablet (20 mg total) by mouth every 6 (six) hours as needed (abdominal cramping)  -     dicyclomine (BENTYL) 20 mg tablet; Take 1 tablet (20 mg total) by mouth every 6 (six) hours as needed (abdominal cramping)    Hypercholesterolemia  -     Comprehensive metabolic panel; Future  -     Lipid panel; Future  #1 Annual physical exam  #2 irritable bowel syndrome, unspecified type  Discussed with patient about renewing dicyclomine to manage cramping as needed  #3 Hypercholesterolemia  Discussed with patient plan to obtain repeat lipid panel prior to next office visit  Patient instructed to return in 6 months or sooner if needed    Subjective:      Patient ID: Krystina Rodas is a 54 y o  male  54 y o male presenting for his annual comprehensive physical exam along with routine 6 month follow-up on depression and hypercholesterolemia  Patient reports that tamsulosin is helpful with urinary strain and if he does not take it makes a difference  Her reports that the sertraline makes him tired but not interested in trying dosage or medication changes  Family History   Problem Relation Age of Onset    Diabetes Mother     Heart attack Father     Diabetes Paternal Grandmother     Heart attack Paternal Grandfather      Social History     Socioeconomic History    Marital status: /Civil Union     Spouse name: Not on file    Number of children: Not on file    Years of education: Not on file    Highest education level: Not on file   Occupational History    Not on file   Tobacco Use    Smoking status: Former Smoker     Quit date:      Years since quittin 4    Smokeless tobacco: Never Used   Vaping Use    Vaping Use: Some days    Substances: THC, CBD   Substance and Sexual Activity    Alcohol use:  Yes Comment: RARE    Drug use: Yes     Types: Marijuana     Comment: medical marijuana    Sexual activity: Not on file   Other Topics Concern    Not on file   Social History Narrative    Not on file     Social Determinants of Health     Financial Resource Strain: Not on file   Food Insecurity: Not on file   Transportation Needs: Not on file   Physical Activity: Not on file   Stress: Not on file   Social Connections: Not on file   Intimate Partner Violence: Not on file   Housing Stability: Not on file     E-Cigarette/Vaping    E-Cigarette Use Current Some Day User      E-Cigarette/Vaping Substances    THC Yes     CBD Yes      History reviewed  No pertinent past medical history  Past Surgical History:   Procedure Laterality Date    TOTAL HIP ARTHROPLASTY Right      Allergies   Allergen Reactions    Soybean Oil - Food Allergy GI Intolerance    Sweet Corn (Diagnostic) - Food Allergy GI Intolerance    Wheat Bran - Food Allergy GI Intolerance       Current Outpatient Medications:     clobetasol (TEMOVATE) 0 05 % cream, Apply topically 2 (two) times a day, Disp: 30 g, Rfl: 6    dicyclomine (BENTYL) 20 mg tablet, Take 1 tablet (20 mg total) by mouth every 6 (six) hours as needed (abdominal cramping), Disp: 30 tablet, Rfl: 2    ketoconazole (NIZORAL) 2 % cream, APPLY TO AFFECTED AREA EVERY DAY, Disp: 30 g, Rfl: 1    pantoprazole (PROTONIX) 40 mg tablet, TAKE 1 TABLET BY MOUTH EVERY DAY, Disp: 30 tablet, Rfl: 5    sertraline (ZOLOFT) 50 mg tablet, TAKE 1 TABLET BY MOUTH EVERY DAY, Disp: 90 tablet, Rfl: 3    tamsulosin (FLOMAX) 0 4 mg, TAKE 1 CAPSULE BY MOUTH EVERY EVENING, Disp: 90 capsule, Rfl: 0    Review of Systems   Constitutional: Negative  Respiratory: Negative  Cardiovascular: Negative  Gastrointestinal: Positive for abdominal pain  Musculoskeletal: Negative  Neurological: Negative  Psychiatric/Behavioral: Negative          Objective:    /78   Pulse 66   Temp 98 5 °F (36 9 °C)  5' 8" (1 727 m)   Wt 101 kg (223 lb)   BMI 33 91 kg/m² (Reviewed)     Physical Exam  Vitals reviewed  Constitutional:       General: He is not in acute distress  Appearance: He is well-developed and well-groomed  He is not ill-appearing  HENT:      Head: Normocephalic and atraumatic  Eyes:      General: Lids are normal       Extraocular Movements: Extraocular movements intact  Conjunctiva/sclera: Conjunctivae normal       Pupils: Pupils are equal, round, and reactive to light  Neck:      Trachea: Trachea and phonation normal    Cardiovascular:      Rate and Rhythm: Normal rate and regular rhythm  Pulses: Normal pulses  Heart sounds: Normal heart sounds  Pulmonary:      Effort: Pulmonary effort is normal       Breath sounds: Normal breath sounds  Skin:     General: Skin is warm and dry  Capillary Refill: Capillary refill takes less than 2 seconds  Neurological:      General: No focal deficit present  Mental Status: He is alert and oriented to person, place, and time  Psychiatric:         Mood and Affect: Mood normal          Behavior: Behavior normal  Behavior is cooperative  Thought Content:  Thought content normal

## 2022-08-01 DIAGNOSIS — F41.9 ANXIETY: ICD-10-CM

## 2022-08-01 DIAGNOSIS — R39.16 STRAINING TO VOID: ICD-10-CM

## 2022-08-01 DIAGNOSIS — R45.4 DIFFICULTY CONTROLLING ANGER: ICD-10-CM

## 2022-08-01 RX ORDER — TAMSULOSIN HYDROCHLORIDE 0.4 MG/1
CAPSULE ORAL
Qty: 90 CAPSULE | Refills: 0 | Status: SHIPPED | OUTPATIENT
Start: 2022-08-01 | End: 2022-09-14 | Stop reason: SDUPTHER

## 2022-09-14 ENCOUNTER — TELEPHONE (OUTPATIENT)
Dept: FAMILY MEDICINE CLINIC | Facility: CLINIC | Age: 52
End: 2022-09-14

## 2022-09-14 DIAGNOSIS — R39.16 STRAINING TO VOID: ICD-10-CM

## 2022-09-14 DIAGNOSIS — N40.1 BENIGN PROSTATIC HYPERPLASIA (BPH) WITH STRAINING ON URINATION: Primary | ICD-10-CM

## 2022-09-14 DIAGNOSIS — F41.9 ANXIETY: ICD-10-CM

## 2022-09-14 DIAGNOSIS — R39.16 BENIGN PROSTATIC HYPERPLASIA (BPH) WITH STRAINING ON URINATION: Primary | ICD-10-CM

## 2022-09-14 DIAGNOSIS — R45.4 DIFFICULTY CONTROLLING ANGER: ICD-10-CM

## 2022-09-14 NOTE — TELEPHONE ENCOUNTER
Patient called asking if medication ZOLOFT can be sent to pharmacy where he is staying on vacation  He didn't realize that he would be away on vacation when he is supposed to refill his meds   Patient asking ZOLOFT to be sent to     [facility] [address], [facility], [address]

## 2022-09-21 RX ORDER — TAMSULOSIN HYDROCHLORIDE 0.4 MG/1
0.4 CAPSULE ORAL EVERY EVENING
Qty: 90 CAPSULE | Refills: 0 | Status: SHIPPED | OUTPATIENT
Start: 2022-09-21

## 2022-09-21 NOTE — TELEPHONE ENCOUNTER
The patient was last seen on 8/20/20 by Dr Nelia Staton in the [facility] location  He is overdue for an office visit  The requested prescription is refilled at this time but an office visit is needed for additional refills  until an appointment can be scheduled and kept  Message forwarded to the [facility] so they can schedule an appointment

## 2022-11-15 ENCOUNTER — OFFICE VISIT (OUTPATIENT)
Dept: URGENT CARE | Facility: MEDICAL CENTER | Age: 52
End: 2022-11-15

## 2022-11-15 VITALS
HEART RATE: 68 BPM | RESPIRATION RATE: 18 BRPM | OXYGEN SATURATION: 97 % | BODY MASS INDEX: 33.65 KG/M2 | HEIGHT: 68 IN | TEMPERATURE: 97.8 F | DIASTOLIC BLOOD PRESSURE: 88 MMHG | WEIGHT: 222 LBS | SYSTOLIC BLOOD PRESSURE: 172 MMHG

## 2022-11-15 DIAGNOSIS — J02.9 ACUTE PHARYNGITIS, UNSPECIFIED ETIOLOGY: Primary | ICD-10-CM

## 2022-11-15 LAB — S PYO AG THROAT QL: NEGATIVE

## 2022-11-15 RX ORDER — PREDNISONE 20 MG/1
TABLET ORAL
Qty: 9 TABLET | Refills: 0 | Status: SHIPPED | OUTPATIENT
Start: 2022-11-15 | End: 2022-11-25 | Stop reason: ALTCHOICE

## 2022-11-15 NOTE — LETTER
November 15, 2022     Patient: Stephany Laboy   YOB: 1970   Date of Visit: 11/15/2022       To Whom it May Concern:    Stephany Laboy was seen in my clinic on 11/15/2022  He is to be excused from work pending the results of his COVID/flu test   If both are negative any remains fever free, he may return to work at that time    If you have any questions or concerns, please don't hesitate to call           Sincerely,          Ran Brush PA-C        CC: No Recipients

## 2022-11-16 LAB
FLUAV RNA RESP QL NAA+PROBE: NEGATIVE
FLUBV RNA RESP QL NAA+PROBE: NEGATIVE
SARS-COV-2 RNA RESP QL NAA+PROBE: NEGATIVE

## 2022-11-16 NOTE — PROGRESS NOTES
[address]        NAME: Vladimir Castillo is a 54 y o  male  : 1970    MRN: [id number]  DATE: November 15, 2022  TIME: 7:38 PM    Assessment and Plan   Acute pharyngitis, unspecified etiology [J02 9]  1  Acute pharyngitis, unspecified etiology  POCT rapid strepA    Throat culture    Covid19 and INFLUENZA A/B PCR    predniSONE 20 mg tablet         Patient Instructions     1  Over-the-counter ibuprofen and/or acetaminophen as needed for pain or fever  2  Gargle salt water as needed for sore throat pain relief  3  Increase oral fluids  4  Observe a liquid and/or soft diet until symptoms improve  5  Go to the ER immediately for any worsening symptoms, inability to swallow, shortness of breath, or any other ominous symptoms  Chief Complaint     Chief Complaint   Patient presents with   • Cold Like Symptoms     Pt  C/O post nasal drip with cough and sore throat for the past 4 days  Denies fevers  History of Present Illness       24-year-old male patient with a 4 day history of significant sore throat which was initially accompanied by fever and chills and body aches  Patient states the latter symptoms went away after the 1st day or 2 and he has been left with a persistent sore throat  Patient denies any other accompanying symptoms  He is able to swallow but painfully  Review of Systems   Review of Systems   Constitutional: Negative for chills, fatigue and fever  HENT: Positive for sore throat  Negative for ear pain  Eyes: Negative for pain and visual disturbance  Respiratory: Negative for cough and shortness of breath  Cardiovascular: Negative for chest pain and palpitations  Gastrointestinal: Negative for abdominal pain and vomiting  Genitourinary: Negative for dysuria and hematuria  Musculoskeletal: Negative for arthralgias and back pain  Skin: Negative for color change and rash  Neurological: Negative for seizures, syncope and headaches     All other systems reviewed and are negative  Current Medications       Current Outpatient Medications:   •  clobetasol (TEMOVATE) 0 05 % cream, Apply topically 2 (two) times a day, Disp: 30 g, Rfl: 6  •  dicyclomine (BENTYL) 20 mg tablet, Take 1 tablet (20 mg total) by mouth every 6 (six) hours as needed (abdominal cramping), Disp: 30 tablet, Rfl: 2  •  ketoconazole (NIZORAL) 2 % cream, APPLY TO AFFECTED AREA EVERY DAY, Disp: 30 g, Rfl: 1  •  pantoprazole (PROTONIX) 40 mg tablet, TAKE 1 TABLET BY MOUTH EVERY DAY, Disp: 30 tablet, Rfl: 5  •  predniSONE 20 mg tablet, Take all 3 tablets together once daily in the morning x3 days, Disp: 9 tablet, Rfl: 0  •  sertraline (ZOLOFT) 50 mg tablet, Take 1 tablet (50 mg total) by mouth daily, Disp: 90 tablet, Rfl: 0  •  tamsulosin (FLOMAX) 0 4 mg, Take 1 capsule (0 4 mg total) by mouth every evening, Disp: 90 capsule, Rfl: 0    Current Allergies     Allergies as of 11/15/2022 - Reviewed 11/15/2022   Allergen Reaction Noted   • Soybean oil - food allergy GI Intolerance 07/28/2021   • Sweet corn (diagnostic) - food allergy GI Intolerance 12/10/2021   • Wheat bran - food allergy GI Intolerance 07/28/2021            The following portions of the patient's history were reviewed and updated as appropriate: allergies, current medications, past family history, past medical history, past social history, past surgical history and problem list      No past medical history on file  Past Surgical History:   Procedure Laterality Date   • TOTAL HIP ARTHROPLASTY Right        Family History   Problem Relation Age of Onset   • Diabetes Mother    • Heart attack Father    • Diabetes Paternal Grandmother    • Heart attack Paternal Grandfather          Medications have been verified          Objective   BP (!) 172/88   Pulse 68   Temp 97 8 °F (36 6 °C)   Resp 18   Ht 5' 8" (1 727 m)   Wt 101 kg (222 lb)   SpO2 97%   BMI 33 75 kg/m²        Physical Exam     Physical Exam  Vitals and nursing note reviewed  Constitutional:       Appearance: Normal appearance  HENT:      Head: Normocephalic  Nose: Nose normal       Mouth/Throat:      Mouth: Mucous membranes are dry  Pharynx: Pharyngeal swelling and posterior oropharyngeal erythema present  No oropharyngeal exudate  Tonsils: No tonsillar exudate or tonsillar abscesses  2+ on the right  2+ on the left  Eyes:      Conjunctiva/sclera: Conjunctivae normal       Pupils: Pupils are equal, round, and reactive to light  Cardiovascular:      Rate and Rhythm: Normal rate and regular rhythm  Pulses: Normal pulses  Pulmonary:      Effort: Pulmonary effort is normal       Breath sounds: Normal breath sounds  Abdominal:      Tenderness: There is no abdominal tenderness  Musculoskeletal:         General: Normal range of motion  Cervical back: Normal range of motion and neck supple  Skin:     General: Skin is warm and dry  Capillary Refill: Capillary refill takes less than 2 seconds  Neurological:      General: No focal deficit present  Mental Status: He is alert and oriented to person, place, and time     Psychiatric:         Mood and Affect: Mood normal          Behavior: Behavior normal

## 2022-11-16 NOTE — PATIENT INSTRUCTIONS
1  Over-the-counter ibuprofen and/or acetaminophen as needed for pain or fever  2  Gargle salt water as needed for sore throat pain relief  3  Increase oral fluids  4  Observe a liquid and/or soft diet until symptoms improve  5  Go to the ER immediately for any worsening symptoms, inability to swallow, shortness of breath, or any other ominous symptoms

## 2022-11-18 LAB — BACTERIA THROAT CULT: NORMAL

## 2022-11-25 ENCOUNTER — OFFICE VISIT (OUTPATIENT)
Dept: FAMILY MEDICINE CLINIC | Facility: CLINIC | Age: 52
End: 2022-11-25

## 2022-11-25 VITALS
DIASTOLIC BLOOD PRESSURE: 82 MMHG | HEIGHT: 68 IN | WEIGHT: 229.8 LBS | BODY MASS INDEX: 34.83 KG/M2 | TEMPERATURE: 97.5 F | HEART RATE: 68 BPM | SYSTOLIC BLOOD PRESSURE: 160 MMHG | OXYGEN SATURATION: 96 %

## 2022-11-25 DIAGNOSIS — K58.9 IRRITABLE BOWEL SYNDROME, UNSPECIFIED TYPE: ICD-10-CM

## 2022-11-25 DIAGNOSIS — F33.9 DEPRESSION, RECURRENT (HCC): Primary | ICD-10-CM

## 2022-11-25 DIAGNOSIS — J02.9 PHARYNGITIS, UNSPECIFIED ETIOLOGY: ICD-10-CM

## 2022-11-25 DIAGNOSIS — E78.00 HYPERCHOLESTEROLEMIA: ICD-10-CM

## 2022-11-25 PROBLEM — N40.1 BENIGN PROSTATIC HYPERPLASIA WITH URINARY FREQUENCY: Status: ACTIVE | Noted: 2022-11-25

## 2022-11-25 PROBLEM — R35.0 BENIGN PROSTATIC HYPERPLASIA WITH URINARY FREQUENCY: Status: ACTIVE | Noted: 2022-11-25

## 2022-11-25 RX ORDER — DICYCLOMINE HCL 20 MG
20 TABLET ORAL EVERY 6 HOURS PRN
Qty: 30 TABLET | Refills: 2 | Status: SHIPPED | OUTPATIENT
Start: 2022-11-25

## 2022-11-25 RX ORDER — AMOXICILLIN 875 MG/1
875 TABLET, COATED ORAL 2 TIMES DAILY
Qty: 14 TABLET | Refills: 0 | Status: SHIPPED | OUTPATIENT
Start: 2022-11-25 | End: 2022-12-02

## 2022-11-25 NOTE — PROGRESS NOTES
Name: Markie Simental      : 1970      MRN: [id number]  Encounter Provider: AZALIA Min  Encounter Date: 2022   Encounter department: [address] [place] [address]     1  Depression, recurrent ([facility] )    2  Hypercholesterolemia    3  Irritable bowel syndrome, unspecified type  -     dicyclomine (BENTYL) 20 mg tablet; Take 1 tablet (20 mg total) by mouth every 6 (six) hours as needed (abdominal cramping)    4  Pharyngitis, unspecified etiology  -     amoxicillin (AMOXIL) 875 mg tablet; Take 1 tablet (875 mg total) by mouth 2 (two) times a day for 7 days  #1 Depression, recurrent ([facility] )  Stable - continue on medication and recheck in 6 months  #2 Hypercholesterolemia  Discussed with patient plan to repeat lipid panel and treat accordingly  #3 Irritable bowel syndrome, unspecified type  Discussed with patient plan to renew prescription for Bentyl  #4 Pharyngitis, unspecified etiology  Discussed with patient plan to treat with 7 day course of amoxicillin  Patient instructed to call if no improvement in 72 hours or symptoms worsen       Subjective      51 y o male presenting for a 6 month follow up for chronic medication conditions  Cherise also reports that he has had a sore throat and nasal congestion for the past 2 weeks  Patient reports that his anger issues and depression her manageable on sertraline  Patient does state that occasionally his children arguing with each other gets his anger up but it is controllable with the medication  Patient had not been using his Bentyl to control his colon spasms but he is noticing an increase in the spasms so will be going back on the Bentyl  Patient reports that both his wife and himself have been having sore throat and nasal congestion for the past 2 weeks    He denies any kind of home COVID test   Patient denies fever, chills, headache, generalized body aches, shortness of breath, chest tightness or GI symptoms being associated with the sore throat and congestion  Patient was seen at [facility] on November 15, 2022 and received a tapering course of steroids at that time  Patient reports that his symptoms do not worsen but did not improve on the steroids  Review of Systems   Constitutional: Negative for chills, fatigue and fever  HENT: Positive for congestion, ear pain, postnasal drip and sore throat  Respiratory: Negative for cough, chest tightness and wheezing  Cardiovascular: Negative for chest pain and palpitations  Gastrointestinal: Positive for abdominal pain  Negative for abdominal distention, diarrhea and nausea  Musculoskeletal: Negative for myalgias  Neurological: Negative for dizziness, light-headedness and headaches  Psychiatric/Behavioral: Negative  Current Outpatient Medications on File Prior to Visit   Medication Sig   • clobetasol (TEMOVATE) 0 05 % cream Apply topically 2 (two) times a day   • ketoconazole (NIZORAL) 2 % cream APPLY TO AFFECTED AREA EVERY DAY   • pantoprazole (PROTONIX) 40 mg tablet TAKE 1 TABLET BY MOUTH EVERY DAY   • sertraline (ZOLOFT) 50 mg tablet Take 1 tablet (50 mg total) by mouth daily   • tamsulosin (FLOMAX) 0 4 mg Take 1 capsule (0 4 mg total) by mouth every evening   • [DISCONTINUED] dicyclomine (BENTYL) 20 mg tablet Take 1 tablet (20 mg total) by mouth every 6 (six) hours as needed (abdominal cramping)   • [DISCONTINUED] predniSONE 20 mg tablet Take all 3 tablets together once daily in the morning x3 days       Objective     /82   Pulse 68   Temp 97 5 °F (36 4 °C)   Ht 5' 8" (1 727 m)   Wt 104 kg (229 lb 12 8 oz)   SpO2 96%   BMI 34 94 kg/m² (Reviewed)    Physical Exam  Vitals reviewed  Constitutional:       General: He is not in acute distress  Appearance: He is well-developed and well-groomed  He is not ill-appearing  HENT:      Head: Normocephalic and atraumatic        Right Ear: Ear canal and external ear normal  Tympanic membrane is injected  Left Ear: Tympanic membrane, ear canal and external ear normal       Mouth/Throat:      Pharynx: Posterior oropharyngeal erythema present  Eyes:      General: Lids are normal       Extraocular Movements: Extraocular movements intact  Conjunctiva/sclera: Conjunctivae normal       Pupils: Pupils are equal, round, and reactive to light  Neck:      Trachea: Trachea and phonation normal    Cardiovascular:      Rate and Rhythm: Normal rate and regular rhythm  Pulses:           Radial pulses are 2+ on the right side and 2+ on the left side  Heart sounds: Normal heart sounds  No murmur heard  Pulmonary:      Effort: Pulmonary effort is normal  No respiratory distress  Breath sounds: Normal breath sounds  No wheezing or rhonchi  Abdominal:      General: Bowel sounds are normal  There is no distension  Palpations: Abdomen is soft  Tenderness: There is no abdominal tenderness  Skin:     General: Skin is warm and dry  Capillary Refill: Capillary refill takes less than 2 seconds  Neurological:      General: No focal deficit present  Mental Status: He is alert and oriented to person, place, and time  Psychiatric:         Mood and Affect: Mood normal          Behavior: Behavior normal  Behavior is cooperative         [address] [facility] Samantha Medrano

## 2023-01-16 ENCOUNTER — OFFICE VISIT (OUTPATIENT)
Dept: UROLOGY | Facility: MEDICAL CENTER | Age: 53
End: 2023-01-16

## 2023-01-16 VITALS
WEIGHT: 225.6 LBS | DIASTOLIC BLOOD PRESSURE: 84 MMHG | HEART RATE: 64 BPM | HEIGHT: 68 IN | BODY MASS INDEX: 34.19 KG/M2 | SYSTOLIC BLOOD PRESSURE: 138 MMHG | OXYGEN SATURATION: 100 %

## 2023-01-16 DIAGNOSIS — N40.1 BENIGN PROSTATIC HYPERPLASIA (BPH) WITH STRAINING ON URINATION: Primary | ICD-10-CM

## 2023-01-16 DIAGNOSIS — R39.16 BENIGN PROSTATIC HYPERPLASIA (BPH) WITH STRAINING ON URINATION: Primary | ICD-10-CM

## 2023-01-16 LAB
POST-VOID RESIDUAL VOLUME, ML POC: 40 ML
SL AMB  POCT GLUCOSE, UA: NORMAL
SL AMB LEUKOCYTE ESTERASE,UA: NORMAL
SL AMB POCT BILIRUBIN,UA: NORMAL
SL AMB POCT BLOOD,UA: NORMAL
SL AMB POCT CLARITY,UA: CLEAR
SL AMB POCT COLOR,UA: YELLOW
SL AMB POCT KETONES,UA: NORMAL
SL AMB POCT NITRITE,UA: NORMAL
SL AMB POCT PH,UA: 6
SL AMB POCT SPECIFIC GRAVITY,UA: >=1.03
SL AMB POCT URINE PROTEIN: NORMAL
SL AMB POCT UROBILINOGEN: 0.2

## 2023-01-16 NOTE — PROGRESS NOTES
1/16/2023      Chief Complaint   Patient presents with   • Benign Prostatic Hypertrophy         Assessment and Plan    50 y o  male previously managed by Dr Jean Pierre Garcia     1  Prostatitis  · No acute flare ups   · Doing well off Flomax  · PVR: 40 mL   · Follow up prn  Patient to start prostate cancer screening at age 56  History of Present Illness  Trista Cope is a 50 y o  male here for evaluation of prostatitis  Patient was evaluated by Dr Merline Deiters in 2020 for suspected acute prostatitis  Patient reported straining to void but no evidence of prostate enlargement on EVERT at that time  He was started on Flomax for his symptoms and was to follow up in 2 years  Patient states he ran out of Flomax and has been doing well off the medication  He notes double voiding in the morning due to sensation of incomplete bladder emptying  He also has nocturia once per night but otherwise denies any urinary symptoms  He denies any perineal, groin, or scrotal pain  He denies any dysuria or gross hematuria  He denies any fevers or chills  He also does not have any family history of prostate cancer  Review of Systems   Constitutional: Negative for chills and fever  HENT: Negative  Respiratory: Negative  Cardiovascular: Negative  Gastrointestinal: Negative for abdominal pain, nausea and vomiting  Genitourinary: Negative for difficulty urinating, dysuria, flank pain, frequency, hematuria, scrotal swelling and testicular pain  Double voids in the morning  Slightly strains in the morning  Nocturia once per night, occasionally twice  Musculoskeletal: Negative  Skin: Negative  Neurological: Negative        AUA SYMPTOM SCORE    Flowsheet Row Most Recent Value   AUA SYMPTOM SCORE    How often have you had a sensation of not emptying your bladder completely after you finished urinating? 4 (P)     How often have you had to urinate again less than two hours after you finished urinating? 3 (P)     How often have you found you stopped and started again several times when you urinate? 2 (P)     How often have you found it difficult to postpone urination? 2 (P)     How often have you had a weak urinary stream? 4 (P)     How often have you had to push or strain to begin urination? 4 (P)     How many times did you most typically get up to urinate from the time you went to bed at night until the time you got up in the morning? 2 (P)     Quality of Life: If you were to spend the rest of your life with your urinary condition just the way it is now, how would you feel about that? 3 (P)     AUA SYMPTOM SCORE 21 (P)          Vitals  Vitals:    01/16/23 0801   BP: 138/84   BP Location: Left arm   Patient Position: Sitting   Cuff Size: Adult   Pulse: 64   SpO2: 100%   Weight: 102 kg (225 lb 9 6 oz)   Height: 5' 8" (1 727 m)       Physical Exam  Vitals reviewed  Constitutional:       General: He is not in acute distress  Appearance: Normal appearance  He is obese  He is not ill-appearing, toxic-appearing or diaphoretic  HENT:      Head: Normocephalic and atraumatic  Eyes:      Conjunctiva/sclera: Conjunctivae normal    Pulmonary:      Effort: Pulmonary effort is normal  No respiratory distress  Abdominal:      General: There is no distension  Palpations: Abdomen is soft  Tenderness: There is no abdominal tenderness  There is no right CVA tenderness, left CVA tenderness, guarding or rebound  Musculoskeletal:         General: Normal range of motion  Cervical back: Normal range of motion  Skin:     General: Skin is warm and dry  Neurological:      General: No focal deficit present  Mental Status: He is alert and oriented to person, place, and time  Psychiatric:         Mood and Affect: Mood normal          Behavior: Behavior normal          Thought Content: Thought content normal          Judgment: Judgment normal            Past History  History reviewed   No pertinent past medical history    Social History     Socioeconomic History   • Marital status: /Civil Union     Spouse name: None   • Number of children: None   • Years of education: None   • Highest education level: None   Occupational History   • None   Tobacco Use   • Smoking status: Former     Types: Cigarettes     Quit date:      Years since quittin 0   • Smokeless tobacco: Never   Vaping Use   • Vaping Use: Former   • Substances: THC, CBD   Substance and Sexual Activity   • Alcohol use: Yes     Comment: RARE   • Drug use: Yes     Types: Marijuana     Comment: medical marijuana   • Sexual activity: None   Other Topics Concern   • None   Social History Narrative   • None     Social Determinants of Health     Financial Resource Strain: Not on file   Food Insecurity: Not on file   Transportation Needs: Not on file   Physical Activity: Not on file   Stress: Not on file   Social Connections: Not on file   Intimate Partner Violence: Not on file   Housing Stability: Not on file     Social History     Tobacco Use   Smoking Status Former   • Types: Cigarettes   • Quit date:    • Years since quittin 0   Smokeless Tobacco Never     Family History   Problem Relation Age of Onset   • Diabetes Mother    • Heart attack Father    • Diabetes Paternal Grandmother    • Heart attack Paternal Grandfather        The following portions of the patient's history were reviewed and updated as appropriate: allergies, current medications, past medical history, past social history, past surgical history and problem list     Results  Recent Results (from the past 1 hour(s))   POCT urine dip auto non-scope    Collection Time: 23  8:07 AM   Result Value Ref Range     COLOR,UA yellow     CLARITY,UA clear     SPECIFIC GRAVITY,UA >=1 030      PH,UA 6 0     LEUKOCYTE ESTERASE,UA neg     NITRITE,UA neg     GLUCOSE, UA neg     KETONES,UA neg     BILIRUBIN,UA neg     BLOOD,UA neg     POCT URINE PROTEIN neg     SL AMB POCT UROBILINOGEN 0 2 ]  No results found for: PSA  Lab Results   Component Value Date    GLUCOSE 97 05/13/2014    CALCIUM 9 0 02/06/2018     05/13/2014    K 3 8 02/06/2018    CO2 29 02/06/2018     02/06/2018    BUN 19 02/06/2018    CREATININE 0 90 02/06/2018     Lab Results   Component Value Date    WBC 6 63 02/06/2018    HGB 15 9 02/06/2018    HCT 48 3 02/06/2018    MCV 89 02/06/2018     02/06/2018     Ayush Root PA-C

## 2023-03-14 ENCOUNTER — TELEPHONE (OUTPATIENT)
Dept: FAMILY MEDICINE CLINIC | Facility: CLINIC | Age: 53
End: 2023-03-14

## 2023-03-14 DIAGNOSIS — R45.4 DIFFICULTY CONTROLLING ANGER: ICD-10-CM

## 2023-03-14 DIAGNOSIS — J02.9 ACUTE PHARYNGITIS, UNSPECIFIED ETIOLOGY: Primary | ICD-10-CM

## 2023-03-14 DIAGNOSIS — L40.9 PSORIASIS: ICD-10-CM

## 2023-03-14 DIAGNOSIS — F41.9 ANXIETY: ICD-10-CM

## 2023-03-14 RX ORDER — CLOBETASOL PROPIONATE 0.5 MG/G
CREAM TOPICAL 2 TIMES DAILY
Qty: 30 G | Refills: 0 | Status: SHIPPED | OUTPATIENT
Start: 2023-03-14

## 2023-03-14 RX ORDER — AMOXICILLIN 875 MG/1
875 TABLET, COATED ORAL 2 TIMES DAILY
Qty: 14 TABLET | Refills: 0 | Status: SHIPPED | OUTPATIENT
Start: 2023-03-14 | End: 2023-03-21

## 2023-03-14 NOTE — TELEPHONE ENCOUNTER
Patient called stating that his child tested positive for strep  He has developed a sore throat and major fatigue last night  He is asking if you are able to send something to his pharmacy [facility]

## 2023-04-28 ENCOUNTER — OFFICE VISIT (OUTPATIENT)
Dept: GASTROENTEROLOGY | Facility: CLINIC | Age: 53
End: 2023-04-28

## 2023-04-28 VITALS
DIASTOLIC BLOOD PRESSURE: 95 MMHG | HEIGHT: 68 IN | SYSTOLIC BLOOD PRESSURE: 158 MMHG | BODY MASS INDEX: 34.4 KG/M2 | HEART RATE: 55 BPM | WEIGHT: 227 LBS | OXYGEN SATURATION: 97 %

## 2023-04-28 DIAGNOSIS — R19.7 DIARRHEA, UNSPECIFIED TYPE: ICD-10-CM

## 2023-04-28 DIAGNOSIS — K21.9 GASTROESOPHAGEAL REFLUX DISEASE, UNSPECIFIED WHETHER ESOPHAGITIS PRESENT: Primary | ICD-10-CM

## 2023-04-28 DIAGNOSIS — R19.09 UMBILICAL MASS: ICD-10-CM

## 2023-04-28 RX ORDER — PANTOPRAZOLE SODIUM 40 MG/1
40 TABLET, DELAYED RELEASE ORAL DAILY
Qty: 30 TABLET | Refills: 5 | Status: SHIPPED | OUTPATIENT
Start: 2023-04-28

## 2023-04-28 NOTE — PROGRESS NOTES
[facility] - Outpatient Consultation  Karen Bazan 50 y o  male MRN: [id number]  Encounter: [id number]          ASSESSMENT AND PLAN:      1  Gastroesophageal reflux disease, unspecified whether esophagitis present  Restart Pantoprazole 40mg daily  If going to eat late take a Pepcid before bed  Reflux precautions reviewed  Will plan EGD    2  Diarrhea, unspecified type  Frequent loose stools  Start fiber and probiotic  Will plan colonoscopy    3  Umbilical mass  Probable hernia  Will check US    ______________________________________________________________________    HPI: 53-year-old male with a history of depression who presents for follow-up evaluation of GERD and diarrhea  The symptoms have been problematic for many years  He has taken pantoprazole in the past which he notes has helped him  He takes Imodium as needed for the diarrhea  He feels that his symptoms tend to actually be worse on the weekends  He admits that he probably does not eat as well and tends to eat later in the day  He has no rectal bleeding  He reports that the main issue with his reflux is waking up in the middle of the night with vomiting  He has occasional abdominal cramping but no severe pain  There is no family history of any serious gastrointestinal disorders that he is aware of  He was seen in the office 2 years ago and scheduled to have an EGD and a colonoscopy but they had to be rescheduled  He has never undergone endoscopic evaluation in the past   He reports that his mom recently had a colonoscopy with polyps removed  REVIEW OF SYSTEMS:    CONSTITUTIONAL: Denies any fever, chills, rigors, and weight loss  HEENT: No earache or tinnitus  Denies hearing loss or visual disturbances  CARDIOVASCULAR: No chest pain or palpitations  RESPIRATORY: Denies any cough, hemoptysis, shortness of breath or dyspnea on exertion  GASTROINTESTINAL: As noted in the History of Present Illness  "  GENITOURINARY: No problems with urination  Denies any hematuria or dysuria  NEUROLOGIC: No dizziness or vertigo, denies headaches  MUSCULOSKELETAL: Denies any muscle or joint pain  SKIN: Denies skin rashes or itching  ENDOCRINE: Denies excessive thirst  Denies intolerance to heat or cold  PSYCHOSOCIAL: Denies depression or anxiety  Denies any recent memory loss  Historical Information   Past Medical History:   Diagnosis Date   • GERD (gastroesophageal reflux disease)      Past Surgical History:   Procedure Laterality Date   • TOTAL HIP ARTHROPLASTY Right      Social History   Social History     Substance and Sexual Activity   Alcohol Use Yes    Comment: RARE     Social History     Substance and Sexual Activity   Drug Use Yes   • Types: Marijuana    Comment: medical marijuana     Social History     Tobacco Use   Smoking Status Former   • Types: Cigarettes   • Quit date:    • Years since quittin 3   Smokeless Tobacco Never     Family History   Problem Relation Age of Onset   • Diabetes Mother    • Heart attack Father    • Diabetes Paternal Grandmother    • Heart attack Paternal Grandfather        Meds/Allergies       Current Outpatient Medications:   •  clobetasol (TEMOVATE) 0 05 % cream  •  dicyclomine (BENTYL) 20 mg tablet  •  ketoconazole (NIZORAL) 2 % cream  •  pantoprazole (PROTONIX) 40 mg tablet  •  sertraline (ZOLOFT) 50 mg tablet    Allergies   Allergen Reactions   • Soybean Oil - Food Allergy GI Intolerance   • Sweet Corn (Diagnostic) - Food Allergy GI Intolerance   • Wheat Bran - Food Allergy GI Intolerance           Objective     Blood pressure 158/95, pulse 55, height 5' 8\" (1 727 m), weight 103 kg (227 lb), SpO2 97 %  Body mass index is 34 52 kg/m²          PHYSICAL EXAM:      General Appearance:   Alert, cooperative, no distress   HEENT:   Normocephalic, atraumatic, anicteric      Neck:  Supple, symmetrical, trachea midline   Lungs:   Clear to auscultation bilaterally; no rales, " rhonchi or wheezing; respirations unlabored    Heart[de-identified]   Regular rate and rhythm; no murmur, rub, or gallop  Abdomen:   Soft, non-tender, non-distended; normal bowel sounds; no masses, no organomegaly    Genitalia:   Deferred    Rectal:   Deferred    Extremities:  No cyanosis, clubbing or edema    Pulses:  2+ and symmetric    Skin:  No jaundice, rashes, or lesions    Lymph nodes:  No palpable cervical lymphadenopathy        Lab Results:   No visits with results within 1 Day(s) from this visit  Latest known visit with results is:   Office Visit on 01/16/2023   Component Date Value   •  COLOR,UA 01/16/2023 yellow    • CLARITY,UA 01/16/2023 clear    • SPECIFIC GRAVITY,UA 01/16/2023 >=1 030    •  PH,UA 01/16/2023 6 0    • LEUKOCYTE ESTERASE,UA 01/16/2023 neg    • NITRITE,UA 01/16/2023 neg    • GLUCOSE, UA 01/16/2023 neg    • KETONES,UA 01/16/2023 neg    • BILIRUBIN,UA 01/16/2023 neg    • BLOOD,UA 01/16/2023 neg    • POCT URINE PROTEIN 01/16/2023 neg    • SL AMB POCT UROBILINOGEN 01/16/2023 0 2    • POST-VOID RESIDUAL VOLUM* 01/16/2023 40          Radiology Results:   No results found    Answers for HPI/ROS submitted by the patient on 4/26/2023  Chronicity: recurrent  Onset: more than 1 year ago  Onset quality: undetermined  Frequency: every several days  Episode duration: 2 Hours  Progression since onset: waxing and waning  Pain location: generalized abdominal region  Pain - numeric: 4/10  Pain quality: aching, burning, dull  Radiates to: does not radiate  anorexia: Yes  arthralgias: Yes  belching: Yes  constipation: No  diarrhea: Yes  dysuria: No  fever: No  flatus: Yes  frequency: Yes  headaches: No  hematochezia: No  hematuria: No  melena: No  myalgias: No  nausea: Yes  weight loss: No  vomiting: Yes  Aggravated by: nothing, movement  Relieved by: belching, bowel movements, certain positions, passing flatus, vomiting

## 2023-05-26 DIAGNOSIS — K21.9 GASTROESOPHAGEAL REFLUX DISEASE, UNSPECIFIED WHETHER ESOPHAGITIS PRESENT: ICD-10-CM

## 2023-05-27 RX ORDER — PANTOPRAZOLE SODIUM 40 MG/1
TABLET, DELAYED RELEASE ORAL
Qty: 90 TABLET | Refills: 2 | Status: SHIPPED | OUTPATIENT
Start: 2023-05-27

## 2023-06-09 DIAGNOSIS — F41.9 ANXIETY: ICD-10-CM

## 2023-06-09 DIAGNOSIS — R45.4 DIFFICULTY CONTROLLING ANGER: ICD-10-CM

## 2023-06-16 ENCOUNTER — OFFICE VISIT (OUTPATIENT)
Dept: FAMILY MEDICINE CLINIC | Facility: CLINIC | Age: 53
End: 2023-06-16
Payer: COMMERCIAL

## 2023-06-16 VITALS
HEART RATE: 68 BPM | DIASTOLIC BLOOD PRESSURE: 78 MMHG | OXYGEN SATURATION: 98 % | TEMPERATURE: 97.6 F | SYSTOLIC BLOOD PRESSURE: 130 MMHG | WEIGHT: 229.5 LBS | BODY MASS INDEX: 34.78 KG/M2 | HEIGHT: 68 IN

## 2023-06-16 DIAGNOSIS — Z12.5 SCREENING FOR PROSTATE CANCER: ICD-10-CM

## 2023-06-16 DIAGNOSIS — R35.0 FREQUENT URINATION: ICD-10-CM

## 2023-06-16 DIAGNOSIS — R45.4 DIFFICULTY CONTROLLING ANGER: ICD-10-CM

## 2023-06-16 DIAGNOSIS — Z00.00 ANNUAL PHYSICAL EXAM: Primary | ICD-10-CM

## 2023-06-16 DIAGNOSIS — Z82.49 FAMILY HISTORY OF EARLY CAD: ICD-10-CM

## 2023-06-16 DIAGNOSIS — F33.41 RECURRENT MAJOR DEPRESSIVE DISORDER, IN PARTIAL REMISSION (HCC): ICD-10-CM

## 2023-06-16 PROCEDURE — 99396 PREV VISIT EST AGE 40-64: CPT | Performed by: NURSE PRACTITIONER

## 2023-06-16 PROCEDURE — 99213 OFFICE O/P EST LOW 20 MIN: CPT | Performed by: NURSE PRACTITIONER

## 2023-06-16 RX ORDER — ARIPIPRAZOLE 2 MG/1
2 TABLET ORAL DAILY
Qty: 30 TABLET | Refills: 3 | Status: SHIPPED | OUTPATIENT
Start: 2023-06-16

## 2023-06-16 NOTE — PROGRESS NOTES
ADULT ANNUAL [address]    NAME: Marzena Valente  AGE: 50 y o   SEX: male  : 1970     DATE: 2023     Assessment and Plan:     Problem List Items Addressed This Visit        Other    Recurrent major depressive disorder, in partial remission ([facility] )    Relevant Medications    ARIPiprazole (ABILIFY) 2 mg tablet    Other Relevant Orders    Ambulatory Referral to Psychology    Ambulatory Referral to Psychiatry   Other Visit Diagnoses     Annual physical exam    -  Primary    Relevant Orders    Lipid panel    Comprehensive metabolic panel    TSH, 3rd generation with Free T4 reflex    Difficulty controlling anger        Relevant Medications    ARIPiprazole (ABILIFY) 2 mg tablet    Other Relevant Orders    Ambulatory Referral to Psychology    Ambulatory Referral to Psychiatry    Frequent urination        Relevant Orders    HEMOGLOBIN A1C W/ EAG ESTIMATION    Family history of early CAD        Relevant Orders    CT coronary calcium score    Screening for prostate cancer        Relevant Orders    PSA, Total Screen        #1 Annual physical exam  Discussed with patient plan to repeat annual labs to evaluate overall health status  #2 Recurrent major depressive disorder, in partial remission ([facility] )  Discussed with patient plan to add-on a mood stabilizer like aripiprazole to the sertraline  Discussed with patient his request to seek out psychiatric help so referrals placed to psychology and psychiatry  #3 Difficulty controlling anger  Discussed with patient plan to add-on a mood stabilizer like aripiprazole to the sertraline  Discussed with patient his request to seek out psychiatric help so referrals placed to psychology and psychiatry  #4 Frequent urination  Discussed with patient plan to order a hemoglobin A1c to check on possible diabetic issues  #5 Family history of early CAD  Discussed with patient plan to obtain a CT calcium score to check on risk factors  #6 Screening for prostate cancer  Discussed with patient that now that he is 50years old he is due for PSA so test ordered  Patient instructed to return in one year or sooner if needed  Patient encouraged to call office for problems or concerns in the interim    Immunizations and preventive care screenings were discussed with patient today  Appropriate education was printed on patient's after visit summary  Discussed risks and benefits of prostate cancer screening  We discussed the controversial history of PSA screening for prostate cancer in the [place] as well as the risk of over detection and over treatment of prostate cancer by way of PSA screening  The patient understands that PSA blood testing is an imperfect way to screen for prostate cancer and that elevated PSA levels in the blood may also be caused by infection, inflammation, prostatic trauma or manipulation, urological procedures, or by benign prostatic enlargement  The role of the digital rectal examination in prostate cancer screening was also discussed and I discussed with him that there is large interobserver variability in the findings of digital rectal examination  Counseling:  Dental Health: discussed importance of regular tooth brushing, flossing, and dental visits  Exercise: the importance of regular exercise/physical activity was discussed  Recommend exercise 3-5 times per week for at least 30 minutes  BMI Counseling: Body mass index is 34 9 kg/m²  The BMI is above normal  Nutrition recommendations include decreasing portion sizes, encouraging healthy choices of fruits and vegetables, consuming healthier snacks, moderation in carbohydrate intake and increasing intake of lean protein  Exercise recommendations include exercising 3-5 times per week and strength training exercises  Rationale for BMI follow-up plan is due to patient being overweight or obese  Return in about 1 year (around 6/16/2024)  Chief Complaint:     Chief Complaint   Patient presents with   • Physical Exam      History of Present Illness:     Adult Annual Physical   Patient here for a comprehensive physical exam  The patient reports that he has been talking to his wife and is interested in seeking some counselor and psychiatry assistance in managing his mental health issues  He reports that he has been having some increased anger issues so discussed possibility of adding on a new medication for mood stabilization  He also reports that he is having some nasal congestion issues and thinking it maybe related to seasonal allergies  Discussed with patient in the past his family history of early onset coronary disease and would like to pursue obtaining a CT calcium score testing  Patient reports that he is scheduled to obtain colonoscopy along with an EGD on 2023  Diet and Physical Activity  Diet/Nutrition: well balanced diet and consuming 3-5 servings of fruits/vegetables daily  Exercise: walking, 3-4 times a week on average and 30-60 minutes on average  Depression Screening  PHQ-2/9 Depression Screening    Little interest or pleasure in doing things: 1 - several days  Feeling down, depressed, or hopeless: 0 - not at all  Trouble falling or staying asleep, or sleeping too much: 0 - not at all  Feeling tired or having little energy: 2 - more than half the days  Poor appetite or overeatin - several days  Feeling bad about yourself - or that you are a failure or have let yourself or your family down: 1 - several days  Trouble concentrating on things, such as reading the newspaper or watching television: 0 - not at all  Moving or speaking so slowly that other people could have noticed   Or the opposite - being so fidgety or restless that you have been moving around a lot more than usual: 0 - not at all  Thoughts that you would be better off dead, or of hurting yourself in some way: 0 - not at all  PHQ-9 Score: 5   PHQ-9 Interpretation: Mild depression        General Health  Sleep: sleeps well and gets 7-8 hours of sleep on average  Hearing: normal - bilateral   Vision: no vision problems and wears glasses  Dental: regular dental visits and brushes teeth twice daily   Health  Symptoms include: urinary frequency     Review of Systems:     Review of Systems   Constitutional: Negative for activity change, appetite change and unexpected weight change  HENT: Positive for congestion and postnasal drip  Negative for dental problem, ear pain, hearing loss, nosebleeds, sinus pressure, sneezing, sore throat, tinnitus and trouble swallowing  Eyes: Positive for itching  Negative for visual disturbance  Respiratory: Negative for cough, chest tightness, shortness of breath and wheezing  Cardiovascular: Negative for chest pain, palpitations and leg swelling  Gastrointestinal: Negative for abdominal distention, abdominal pain, constipation, diarrhea and nausea  Endocrine: Negative for polydipsia, polyphagia and polyuria  Genitourinary: Positive for frequency  Negative for decreased urine volume, difficulty urinating, dysuria, flank pain, hematuria and urgency  Musculoskeletal: Negative for arthralgias, back pain, myalgias and neck pain  Skin: Negative for color change and rash  Allergic/Immunologic: Positive for environmental allergies  Neurological: Negative  Negative for dizziness, weakness, light-headedness and headaches  Hematological: Negative  Psychiatric/Behavioral: Negative  Negative for dysphoric mood and sleep disturbance  The patient is not nervous/anxious         Past Medical History:     Past Medical History:   Diagnosis Date   • GERD (gastroesophageal reflux disease)       Past Surgical History:     Past Surgical History:   Procedure Laterality Date   • TOTAL HIP ARTHROPLASTY Right       Family History:     Family History   Problem Relation Age of Onset   • Diabetes Mother    • Heart attack Father    • Diabetes Paternal Grandmother    • Heart attack Paternal Grandfather       Social History:     Social History     Socioeconomic History   • Marital status: /Civil Union     Spouse name: None   • Number of children: None   • Years of education: None   • Highest education level: None   Occupational History   • None   Tobacco Use   • Smoking status: Former     Types: Cigarettes     Quit date:      Years since quittin 4     Passive exposure: Past   • Smokeless tobacco: Never   Vaping Use   • Vaping Use: Former   • Substances: THC, CBD   Substance and Sexual Activity   • Alcohol use: Yes     Comment: RARE   • Drug use: Yes     Types: Marijuana     Comment: medical marijuana   • Sexual activity: None   Other Topics Concern   • None   Social History Narrative   • None     Social Determinants of Health     Financial Resource Strain: Not on file   Food Insecurity: Not on file   Transportation Needs: Not on file   Physical Activity: Not on file   Stress: Not on file   Social Connections: Not on file   Intimate Partner Violence: Not on file   Housing Stability: Not on file      Current Medications:     Current Outpatient Medications   Medication Sig Dispense Refill   • ARIPiprazole (ABILIFY) 2 mg tablet Take 1 tablet (2 mg total) by mouth daily 30 tablet 3   • clobetasol (TEMOVATE) 0 05 % cream Apply topically 2 (two) times a day 30 g 0   • dicyclomine (BENTYL) 20 mg tablet Take 1 tablet (20 mg total) by mouth every 6 (six) hours as needed (abdominal cramping) 30 tablet 2   • ketoconazole (NIZORAL) 2 % cream APPLY TO AFFECTED AREA EVERY DAY 30 g 1   • pantoprazole (PROTONIX) 40 mg tablet TAKE 1 TABLET BY MOUTH EVERY DAY 90 tablet 2   • sertraline (ZOLOFT) 50 mg tablet TAKE 1 TABLET BY MOUTH EVERY DAY 90 tablet 0     No current facility-administered medications for this visit  Allergies:      Allergies   Allergen Reactions   • Soybean Oil - Food Allergy GI Intolerance   • Sweet Corn (Diagnostic) "- Food Allergy GI Intolerance   • Wheat Bran - Food Allergy GI Intolerance      Physical Exam:     /78 (BP Location: Left arm, Patient Position: Sitting)   Pulse 68   Temp 97 6 °F (36 4 °C)   Ht 5' 8\" (1 727 m)   Wt 104 kg (229 lb 8 oz)   SpO2 98%   BMI 34 90 kg/m² (Reviewed)    Physical Exam  Vitals reviewed  Constitutional:       General: He is not in acute distress  Appearance: He is well-developed and well-groomed  He is not ill-appearing  HENT:      Head: Normocephalic and atraumatic  Right Ear: Tympanic membrane, ear canal and external ear normal       Left Ear: Tympanic membrane, ear canal and external ear normal       Nose: Mucosal edema and congestion present  No nasal tenderness  Right Sinus: No maxillary sinus tenderness or frontal sinus tenderness  Left Sinus: No maxillary sinus tenderness or frontal sinus tenderness  Mouth/Throat:      Lips: Pink  Mouth: Mucous membranes are moist       Pharynx: Oropharynx is clear  Eyes:      General: Lids are normal       Extraocular Movements: Extraocular movements intact  Conjunctiva/sclera: Conjunctivae normal       Pupils: Pupils are equal, round, and reactive to light  Neck:      Thyroid: No thyromegaly or thyroid tenderness  Trachea: Trachea and phonation normal    Cardiovascular:      Rate and Rhythm: Normal rate and regular rhythm  Pulses:           Radial pulses are 2+ on the right side and 2+ on the left side  Dorsalis pedis pulses are 2+ on the right side and 2+ on the left side  Posterior tibial pulses are 2+ on the right side and 2+ on the left side  Heart sounds: Normal heart sounds  No murmur heard  Pulmonary:      Effort: Pulmonary effort is normal       Breath sounds: Normal breath sounds  Abdominal:      General: Abdomen is flat  Bowel sounds are normal  There is no distension  Palpations: Abdomen is soft  Tenderness: There is no abdominal tenderness   " Musculoskeletal:      Cervical back: Neck supple  Right lower leg: No edema  Left lower leg: No edema  Skin:     General: Skin is warm and dry  Capillary Refill: Capillary refill takes less than 2 seconds  Nails: There is no clubbing  Neurological:      General: No focal deficit present  Mental Status: He is alert and oriented to person, place, and time  Sensory: Sensation is intact  Motor: Motor function is intact  Coordination: Coordination is intact  Deep Tendon Reflexes: Reflexes are normal and symmetric  Psychiatric:         Mood and Affect: Mood normal          Speech: Speech normal          Behavior: Behavior normal  Behavior is cooperative            Joe

## 2023-06-30 ENCOUNTER — TELEPHONE (OUTPATIENT)
Dept: PSYCHIATRY | Facility: CLINIC | Age: 53
End: 2023-06-30

## 2023-06-30 NOTE — TELEPHONE ENCOUNTER
Reached out to patient in regards to routine referral in attempts to verify patient's needs of service  Spoke with patient, stated has already established services outside of [facility]'s  Writer informed pt referral will be closed, and can contact intake dept if needed

## 2023-07-12 DIAGNOSIS — L40.9 PSORIASIS: ICD-10-CM

## 2023-07-12 RX ORDER — CLOBETASOL PROPIONATE 0.5 MG/G
CREAM TOPICAL
Qty: 30 G | Refills: 0 | Status: SHIPPED | OUTPATIENT
Start: 2023-07-12

## 2023-07-14 ENCOUNTER — ANESTHESIA EVENT (OUTPATIENT)
Dept: GASTROENTEROLOGY | Facility: HOSPITAL | Age: 53
End: 2023-07-14

## 2023-07-14 ENCOUNTER — ANESTHESIA (OUTPATIENT)
Dept: GASTROENTEROLOGY | Facility: HOSPITAL | Age: 53
End: 2023-07-14

## 2023-07-14 ENCOUNTER — HOSPITAL ENCOUNTER (OUTPATIENT)
Dept: GASTROENTEROLOGY | Facility: HOSPITAL | Age: 53
Setting detail: OUTPATIENT SURGERY
Discharge: HOME | End: 2023-07-14
Payer: COMMERCIAL

## 2023-07-14 VITALS
HEART RATE: 59 BPM | RESPIRATION RATE: 20 BRPM | TEMPERATURE: 98 F | SYSTOLIC BLOOD PRESSURE: 135 MMHG | OXYGEN SATURATION: 94 % | BODY MASS INDEX: 33.65 KG/M2 | WEIGHT: 222 LBS | DIASTOLIC BLOOD PRESSURE: 85 MMHG | HEIGHT: 68 IN

## 2023-07-14 DIAGNOSIS — R19.7 DIARRHEA, UNSPECIFIED TYPE: ICD-10-CM

## 2023-07-14 DIAGNOSIS — K21.9 GASTROESOPHAGEAL REFLUX DISEASE, UNSPECIFIED WHETHER ESOPHAGITIS PRESENT: ICD-10-CM

## 2023-07-14 PROCEDURE — 45385 COLONOSCOPY W/LESION REMOVAL: CPT | Performed by: INTERNAL MEDICINE

## 2023-07-14 PROCEDURE — 43239 EGD BIOPSY SINGLE/MULTIPLE: CPT | Performed by: INTERNAL MEDICINE

## 2023-07-14 PROCEDURE — 88305 TISSUE EXAM BY PATHOLOGIST: CPT | Performed by: STUDENT IN AN ORGANIZED HEALTH CARE EDUCATION/TRAINING PROGRAM

## 2023-07-14 PROCEDURE — 88342 IMHCHEM/IMCYTCHM 1ST ANTB: CPT | Performed by: STUDENT IN AN ORGANIZED HEALTH CARE EDUCATION/TRAINING PROGRAM

## 2023-07-14 PROCEDURE — 45380 COLONOSCOPY AND BIOPSY: CPT | Performed by: INTERNAL MEDICINE

## 2023-07-14 RX ORDER — SODIUM CHLORIDE, SODIUM LACTATE, POTASSIUM CHLORIDE, CALCIUM CHLORIDE 600; 310; 30; 20 MG/100ML; MG/100ML; MG/100ML; MG/100ML
INJECTION, SOLUTION INTRAVENOUS CONTINUOUS PRN
Status: DISCONTINUED | OUTPATIENT
Start: 2023-07-14 | End: 2023-07-14

## 2023-07-14 RX ORDER — LIDOCAINE HYDROCHLORIDE 20 MG/ML
INJECTION, SOLUTION EPIDURAL; INFILTRATION; INTRACAUDAL; PERINEURAL AS NEEDED
Status: DISCONTINUED | OUTPATIENT
Start: 2023-07-14 | End: 2023-07-14

## 2023-07-14 RX ORDER — PROPOFOL 10 MG/ML
INJECTION, EMULSION INTRAVENOUS AS NEEDED
Status: DISCONTINUED | OUTPATIENT
Start: 2023-07-14 | End: 2023-07-14

## 2023-07-14 RX ORDER — CHOLESTYRAMINE 4 G/9G
1 POWDER, FOR SUSPENSION ORAL 2 TIMES DAILY WITH MEALS
Qty: 60 PACKET | Refills: 4 | Status: SHIPPED | OUTPATIENT
Start: 2023-07-14 | End: 2023-07-28

## 2023-07-14 RX ADMIN — PROPOFOL 50 MG: 10 INJECTION, EMULSION INTRAVENOUS at 07:51

## 2023-07-14 RX ADMIN — PROPOFOL 50 MG: 10 INJECTION, EMULSION INTRAVENOUS at 07:54

## 2023-07-14 RX ADMIN — PROPOFOL 200 MG: 10 INJECTION, EMULSION INTRAVENOUS at 07:40

## 2023-07-14 RX ADMIN — LIDOCAINE HYDROCHLORIDE 5 ML: 20 INJECTION, SOLUTION EPIDURAL; INFILTRATION; INTRACAUDAL; PERINEURAL at 07:40

## 2023-07-14 RX ADMIN — PROPOFOL 50 MG: 10 INJECTION, EMULSION INTRAVENOUS at 07:45

## 2023-07-14 RX ADMIN — SODIUM CHLORIDE, SODIUM LACTATE, POTASSIUM CHLORIDE, AND CALCIUM CHLORIDE: .6; .31; .03; .02 INJECTION, SOLUTION INTRAVENOUS at 07:08

## 2023-07-14 NOTE — H&P
History and Physical -  [facility]  Mraty Giraldo 50 y.o. male MRN: [id number]                  HPI: Marty Giraldo is a 50y.o. year old male who presents for endoscopy and colonoscopy for GERD and diarrhea      REVIEW OF SYSTEMS: Per the HPI, and otherwise unremarkable. Historical Information   Past Medical History:   Diagnosis Date   • GERD (gastroesophageal reflux disease)      Past Surgical History:   Procedure Laterality Date   • TOTAL HIP ARTHROPLASTY Right      Social History   Social History     Substance and Sexual Activity   Alcohol Use Yes    Comment: RARE     Social History     Substance and Sexual Activity   Drug Use Yes   • Types: Marijuana    Comment: medical marijuana     Social History     Tobacco Use   Smoking Status Former   • Types: Cigarettes   • Quit date:    • Years since quittin.5   • Passive exposure: Past   Smokeless Tobacco Never     Family History   Problem Relation Age of Onset   • Diabetes Mother    • Heart attack Father    • Diabetes Paternal Grandmother    • Heart attack Paternal Grandfather        Meds/Allergies     (Not in a hospital admission)      Allergies   Allergen Reactions   • Soybean Oil - Food Allergy GI Intolerance   • Sweet Corn (Diagnostic) - Food Allergy GI Intolerance   • Wheat Bran - Food Allergy GI Intolerance       Objective     Blood pressure 133/90, pulse 61, temperature 97.5 °F (36.4 °C), temperature source Temporal, resp. rate 16, height 5' 8" (1.727 m), weight 101 kg (222 lb 0.1 oz), SpO2 95 %. PHYSICAL EXAM    /90   Pulse 61   Temp 97.5 °F (36.4 °C) (Temporal)   Resp 16   Ht 5' 8" (1.727 m)   Wt 101 kg (222 lb 0.1 oz)   SpO2 95%   BMI 33.76 kg/m²       Gen: NAD  CV: RRR  CHEST: Clear  ABD: soft, NT/ND  EXT: no edema      ASSESSMENT/PLAN:  This is a 50y.o. year old male here for endoscopy and colonoscopy, and he is stable and optimized for his procedure.

## 2023-07-14 NOTE — ANESTHESIA PREPROCEDURE EVALUATION
Procedure:  COLONOSCOPY  EGD    Relevant Problems   CARDIO   (+) Familial hypertriglyceridemia   (+) Hypercholesterolemia      NEURO/PSYCH   (+) Recurrent major depressive disorder, in partial remission [facility])        Physical Exam    Airway    Mallampati score: I  TM Distance: >3 FB  Neck ROM: full     Dental       Cardiovascular  Cardiovascular exam normal    Pulmonary  Pulmonary exam normal     Other Findings        Anesthesia Plan  ASA Score- 2     Anesthesia Type- IV sedation with anesthesia with ASA Monitors. Additional Monitors:   Airway Plan:           Plan Factors-Exercise tolerance (METS): >4 METS. Chart reviewed. EKG reviewed. Imaging results reviewed. Existing labs reviewed. Patient summary reviewed. Induction- intravenous. Postoperative Plan- Plan for postoperative opioid use. Planned trial extubation    Informed Consent- Anesthetic plan and risks discussed with patient. I personally reviewed this patient with the CRNA. Discussed and agreed on the Anesthesia Plan with the CRNA. Beatriz Bunch

## 2023-07-14 NOTE — ANESTHESIA POSTPROCEDURE EVALUATION
Post-Op Assessment Note    CV Status:  Stable    Pain management: adequate     Mental Status:  Awake   Hydration Status:  Euvolemic   PONV Controlled:  Controlled   Airway Patency:  Patent      Post Op Vitals Reviewed: Yes      Staff: CRNA         No notable events documented.     /81 (07/14/23 0800)    Temp 98 °F (36.7 °C) (07/14/23 0800)    Pulse 65 (07/14/23 0800)   Resp 20 (07/14/23 0800)    SpO2 95 % (07/14/23 0800)

## 2023-07-18 RX ORDER — BUPROPION HYDROCHLORIDE 150 MG/1
TABLET, EXTENDED RELEASE ORAL
COMMUNITY
Start: 2023-07-01

## 2023-07-19 PROCEDURE — 88305 TISSUE EXAM BY PATHOLOGIST: CPT | Performed by: STUDENT IN AN ORGANIZED HEALTH CARE EDUCATION/TRAINING PROGRAM

## 2023-07-19 PROCEDURE — 88342 IMHCHEM/IMCYTCHM 1ST ANTB: CPT | Performed by: STUDENT IN AN ORGANIZED HEALTH CARE EDUCATION/TRAINING PROGRAM

## 2023-07-21 ENCOUNTER — OFFICE VISIT (OUTPATIENT)
Dept: GASTROENTEROLOGY | Facility: CLINIC | Age: 53
End: 2023-07-21
Payer: COMMERCIAL

## 2023-07-21 VITALS
WEIGHT: 226.2 LBS | DIASTOLIC BLOOD PRESSURE: 70 MMHG | BODY MASS INDEX: 34.28 KG/M2 | OXYGEN SATURATION: 94 % | RESPIRATION RATE: 18 BRPM | HEART RATE: 71 BPM | SYSTOLIC BLOOD PRESSURE: 122 MMHG | HEIGHT: 68 IN

## 2023-07-21 DIAGNOSIS — K21.9 GASTROESOPHAGEAL REFLUX DISEASE, UNSPECIFIED WHETHER ESOPHAGITIS PRESENT: Primary | ICD-10-CM

## 2023-07-21 DIAGNOSIS — R19.7 DIARRHEA, UNSPECIFIED TYPE: ICD-10-CM

## 2023-07-21 PROCEDURE — 99213 OFFICE O/P EST LOW 20 MIN: CPT | Performed by: PHYSICIAN ASSISTANT

## 2023-07-21 NOTE — PROGRESS NOTES
[facility] - Outpatient Follow-up Note  Sharon Mayo 50 y.o. male MRN: [id number]  Encounter: [id number]          ASSESSMENT AND PLAN:      1. Gastroesophageal reflux disease, unspecified whether esophagitis present  EGD showed grade B esophagitis  Advised 3 months of PPI and then stop if feeling well  He notes improvement of symptoms after 2 months of pantoprazole and cutting soda out of his diet  He is trying not to eat close to bedtime    2. Diarrhea, unspecified type  Improved status post colonoscopy  He believes that cutting soda out of his diet has improved this as well  He has not started fiber and probiotic and does not feel he needs it at this time  Prescribed cholestyramine after his procedure but has yet to pick it up  He will use this as needed    ______________________________________________________________________    SUBJECTIVE: 22-year-old male with a history of GERD, depression who presents for follow-up after EGD and colonoscopy. His EGD showed grade B esophagitis. He was advised to continue PPI therapy. He reports that he is actually not been taking his pantoprazole in the past month or so as his symptoms have improved. He denies any dysphagia, heartburn or nausea or vomiting. He believes that his symptoms improved when he cut soda out of his diet. He reports he was only drinking about a can of soda a day. His colonoscopy showed 2 sessile polyps that were removed. These were adenomatous. Random biopsies were taken from the colon and noted to be negative for microscopic colitis. Reports that since cutting soda out of his diet and after his colonoscopy his diarrhea has improved. He does not been overly problematic. He has not started fiber and probiotic. He was given a prescription for cholestyramine at his colonoscopy which she has not yet picked up. REVIEW OF SYSTEMS IS OTHERWISE NEGATIVE.       Historical Information   Past Medical History: Diagnosis Date   • GERD (gastroesophageal reflux disease)      Past Surgical History:   Procedure Laterality Date   • TOTAL HIP ARTHROPLASTY Right      Social History   Social History     Substance and Sexual Activity   Alcohol Use Yes    Comment: RARE     Social History     Substance and Sexual Activity   Drug Use Yes   • Types: Marijuana    Comment: medical marijuana     Social History     Tobacco Use   Smoking Status Former   • Types: Cigarettes   • Quit date:    • Years since quittin.5   • Passive exposure: Past   Smokeless Tobacco Never     Family History   Problem Relation Age of Onset   • Diabetes Mother    • Heart attack Father    • Diabetes Paternal Grandmother    • Heart attack Paternal Grandfather        Meds/Allergies       Current Outpatient Medications:   •  ARIPiprazole (ABILIFY) 2 mg tablet  •  buPROPion (Wellbutrin SR) 150 mg 12 hr tablet  •  cholestyramine (QUESTRAN) 4 g packet  •  clobetasol (TEMOVATE) 0.05 % cream  •  dicyclomine (BENTYL) 20 mg tablet  •  ketoconazole (NIZORAL) 2 % cream  •  pantoprazole (PROTONIX) 40 mg tablet  •  sertraline (ZOLOFT) 50 mg tablet    Allergies   Allergen Reactions   • Soybean Oil - Food Allergy GI Intolerance   • Sweet Corn (Diagnostic) - Food Allergy GI Intolerance   • Wheat Bran - Food Allergy GI Intolerance           Objective     Blood pressure 122/70, pulse 71, resp. rate 18, height 5' 8" (1.727 m), weight 103 kg (226 lb 3.2 oz), SpO2 94 %. Body mass index is 34.39 kg/m². PHYSICAL EXAM:      General Appearance:   Alert, cooperative, no distress   HEENT:   Normocephalic, atraumatic, anicteric.     Neck:  Supple, symmetrical, trachea midline   Lungs:   Clear to auscultation bilaterally; no rales, rhonchi or wheezing; respirations unlabored    Heart[de-identified]   Regular rate and rhythm; no murmur, rub, or gallop.    Abdomen:   Soft, non-tender, non-distended; normal bowel sounds; no masses, no organomegaly    Genitalia:   Deferred    Rectal:   Deferred  Extremities:  No cyanosis, clubbing or edema    Pulses:  2+ and symmetric    Skin:  No jaundice, rashes, or lesions    Lymph nodes:  No palpable cervical lymphadenopathy        Lab Results:   No visits with results within 1 Day(s) from this visit. Latest known visit with results is:   Hospital Outpatient Visit on 07/14/2023   Component Date Value   • Case Report 07/14/2023                      Value:Surgical Pathology Report                         Case: [id number]                                   Authorizing Provider:  Sharmaine Rosario MD   Collected:           07/14/2023 0744              Ordering Location:      [address] [place] [address]       Received:            07/14/2023 [address] [place] [address] Dr Endoscopy                                                             Pathologist:           Karen Serrato MD                                                         Specimens:   A) - Duodenum                                                                                       B) - Stomach                                                                                        C) - Colon, random                                                                                  D) - Polyp, Colorectal, ascending x2                                                      • Final Diagnosis 07/14/2023                      Value: This result contains rich text formatting which cannot be displayed here. • Additional Information 07/14/2023                      Value: This result contains rich text formatting which cannot be displayed here. • Synoptic Checklist 07/14/2023                      Value:                            COLON/RECTUM POLYP FORM - GI - D                                                                                     :    Adenoma(s)     • Gross Description 07/14/2023                      Value: This result contains rich text formatting which cannot be displayed here. Radiology Results:   Colonoscopy    Addendum Date: 7/20/2023 Addendum:    [address] [place] [address] Endoscopy [address] [place] [address] [phone] DATE OF SERVICE: 7/14/23 PHYSICIAN(S): Attending: Dada Weldon MD Fellow: No Staff Documented INDICATION: Colon cancer screening and diarrhea POST-OP DIAGNOSIS: See the impression below. HISTORY: Prior colonoscopy: No prior colonoscopy. BOWEL PREPARATION:  PREPROCEDURE: Informed consent was obtained for the procedure, including sedation. Risks including but not limited to bleeding, infection, perforation, adverse drug reaction and aspiration were explained in detail. Also explained about less than 100% sensitivity with the exam and other alternatives. The patient was placed in the left lateral decubitus position. Procedure: Colonoscopy DETAILS OF PROCEDURE: Patient was taken to the procedure room where a time out was performed to confirm correct patient and correct procedure. The patient underwent monitored anesthesia care, which was administered by an anesthesia professional. The patient's blood pressure, heart rate, level of consciousness, oxygen, respirations and ECG were monitored throughout the procedure. A digital rectal exam was performed. The scope was introduced through the anus and advanced to the cecum. Retroflexion was performed in the rectum. The quality of bowel preparation was evaluated using the [facility] Bowel Preparation Scale with scores of: right colon = 2, transverse colon = 2, left colon = 2. The total BBPS score was 6. Bowel prep was adequate. The patient experienced no blood loss. The procedure was not difficult. The patient tolerated the procedure well. There were no apparent adverse events.  ANESTHESIA INFORMATION: ASA: II Anesthesia Type: IV Sedation with Anesthesia MEDICATIONS: No administrations occurring from 0738 to 0800 on 07/14/23 FINDINGS: Two sessile, adenomatous-appearing polyps measuring 10-19 mm in the ascending colon; completely removed en bloc by cold snare and retrieved specimen The cecum, hepatic flexure, transverse colon, splenic flexure, descending colon, sigmoid colon, rectosigmoid and rectum appeared normal. Performed random biopsy using biopsy forceps. EVENTS: Procedure Events Event Event Time ENDO CECUM REACHED 7/14/2023  7:50 AM ENDO SCOPE OUT TIME 7/14/2023  7:58 AM SPECIMENS: ID Type Source Tests Collected by Time Destination 1 : Duodenum Tissue Duodenum TISSUE EXAM Preet Pace MD 7/14/2023  7:44 AM  2 : Stomach Tissue Stomach TISSUE EXAM Preet Pace MD 7/14/2023  7:45 AM  3 : random Tissue Colon TISSUE EXAM Preet Pace MD 7/14/2023  7:59 AM  4 : ascending x2 Tissue Polyp, Colorectal TISSUE EXAM Preet Pace MD 7/14/2023  7:59 AM  EQUIPMENT: Colonoscope -CF-HQ-190L IMPRESSION: Normal colon mucosa 2 polyps status post cold snare polypectomy RECOMMENDATION:  Repeat colonoscopy in 3 years  Personal history of colon polyps   Await pathology results Cholestyramine 1-2 packets daily  Preet Pace MD     Addendum Date: 7/20/2023 Addendum:    [address] [place] [address] Endoscopy 2021 [facility] [address] [phone] DATE OF SERVICE: 7/14/23 PHYSICIAN(S): Attending: Preet Pace MD Fellow: No Staff Documented INDICATION: Colon cancer screening and diarrhea POST-OP DIAGNOSIS: See the impression below. HISTORY: Prior colonoscopy: No prior colonoscopy. BOWEL PREPARATION:  PREPROCEDURE: Informed consent was obtained for the procedure, including sedation. Risks including but not limited to bleeding, infection, perforation, adverse drug reaction and aspiration were explained in detail. Also explained about less than 100% sensitivity with the exam and other alternatives. The patient was placed in the left lateral decubitus position.  Procedure: Colonoscopy DETAILS OF PROCEDURE: Patient was taken to the procedure room where a time out was performed to confirm correct patient and correct procedure. The patient underwent monitored anesthesia care, which was administered by an anesthesia professional. The patient's blood pressure, heart rate, level of consciousness, oxygen, respirations and ECG were monitored throughout the procedure. A digital rectal exam was performed. The scope was introduced through the anus and advanced to the cecum. Retroflexion was performed in the rectum. The quality of bowel preparation was evaluated using the [facility] Bowel Preparation Scale with scores of: right colon = 2, transverse colon = 2, left colon = 2. The total BBPS score was 6. Bowel prep was adequate. The patient experienced no blood loss. The procedure was not difficult. The patient tolerated the procedure well. There were no apparent adverse events. ANESTHESIA INFORMATION: ASA: II Anesthesia Type: IV Sedation with Anesthesia MEDICATIONS: No administrations occurring from 0738 to 0800 on 07/14/23 FINDINGS: Two sessile, adenomatous-appearing polyps measuring 10-19 mm in the ascending colon; completely removed en bloc by cold snare and retrieved specimen The cecum, hepatic flexure, transverse colon, splenic flexure, descending colon, sigmoid colon, rectosigmoid and rectum appeared normal. Performed random biopsy using biopsy forceps.  EVENTS: Procedure Events Event Event Time ENDO CECUM REACHED 7/14/2023  7:50 AM ENDO SCOPE OUT TIME 7/14/2023  7:58 AM SPECIMENS: ID Type Source Tests Collected by Time Destination 1 : Duodenum Tissue Duodenum TISSUE EXAM Libby Cheatham MD 7/14/2023  7:44 AM  2 : Stomach Tissue Stomach TISSUE EXAM Libby Cheatham MD 7/14/2023  7:45 AM  3 : random Tissue Colon TISSUE EXAM Libby Cheatham MD 7/14/2023  7:59 AM  4 : ascending x2 Tissue Polyp, Colorectal TISSUE EXAM Libby Cheatham MD 7/14/2023  7:59 AM  EQUIPMENT: Colonoscope -CF-HQ-190L IMPRESSION: Normal colon mucosa 2 polyps status post cold snare polypectomy RECOMMENDATION:  Repeat colonoscopy in 3 years  Personal history of colon polyps   Await pathology results Cholestyramine 1-2 packets daily  Marty Padilla MD     Result Date: 7/20/2023  Narrative: Table formatting from the original result was not included. [address] [place] [address] [phone] DATE OF SERVICE: 7/14/23 PHYSICIAN(S): Attending: Marty Padilla MD Fellow: No Staff Documented INDICATION: Colon cancer screening and diarrhea POST-OP DIAGNOSIS: See the impression below. HISTORY: Prior colonoscopy: No prior colonoscopy. BOWEL PREPARATION: Miralax/Dulcolax PREPROCEDURE: Informed consent was obtained for the procedure, including sedation. Risks including but not limited to bleeding, infection, perforation, adverse drug reaction and aspiration were explained in detail. Also explained about less than 100% sensitivity with the exam and other alternatives. The patient was placed in the left lateral decubitus position. Procedure: Colonoscopy DETAILS OF PROCEDURE: Patient was taken to the procedure room where a time out was performed to confirm correct patient and correct procedure. The patient underwent monitored anesthesia care, which was administered by an anesthesia professional. The patient's blood pressure, heart rate, level of consciousness, oxygen, respirations and ECG were monitored throughout the procedure. A digital rectal exam was performed. The scope was introduced through the anus and advanced to the cecum. Retroflexion was performed in the rectum. The quality of bowel preparation was evaluated using the [facility] Bowel Preparation Scale with scores of: right colon = 2, transverse colon = 2, left colon = 2. The total BBPS score was 6. Bowel prep was adequate. The patient experienced no blood loss. The procedure was not difficult. The patient tolerated the procedure well. There were no apparent adverse events.  ANESTHESIA INFORMATION: ASA: II Anesthesia Type: IV Sedation with Anesthesia MEDICATIONS: No administrations occurring from 0738 to 0800 on 07/14/23 FINDINGS: Two sessile, adenomatous-appearing polyps measuring 10-19 mm in the ascending colon; completely removed en bloc by cold snare and retrieved specimen The cecum, hepatic flexure, transverse colon, splenic flexure, descending colon, sigmoid colon, rectosigmoid and rectum appeared normal. EVENTS: Procedure Events Event Event Time ENDO CECUM REACHED 7/14/2023  7:50 AM ENDO SCOPE OUT TIME 7/14/2023  7:58 AM SPECIMENS: ID Type Source Tests Collected by Time Destination 1 :  Tissue Duodenum TISSUE EXAM Tanya Bellamy MD 7/14/2023  7:44 AM  2 :  Tissue Stomach TISSUE EXAM Tanya Bellamy MD 7/14/2023  7:45 AM  3 : random Tissue Colon TISSUE EXAM Tanya Bellamy MD 7/14/2023  7:59 AM  4 : ascending x2 Tissue Polyp, Colorectal TISSUE EXAM Tanya Bellamy MD 7/14/2023  7:59 AM  EQUIPMENT: Colonoscope -CF-HQ-190L     Impression: Normal colon mucosa 2 polyps status post cold snare polypectomy RECOMMENDATION:  Repeat colonoscopy in 3 years  Personal history of colon polyps   Await pathology results Cholestyramine 1-2 packets daily  Tanya Bellamy MD     EGD    Result Date: 7/14/2023  Narrative: Table formatting from the original result was not included. [address] [place] [address] Endoscopy 2021 [facility] [id number] [phone] DATE OF SERVICE: 7/14/23 PHYSICIAN(S): Attending: Tanya Bellamy MD Fellow: No Staff Documented INDICATION: Gastroesophageal reflux disease, unspecified whether esophagitis present POST-OP DIAGNOSIS: See the impression below. PREPROCEDURE: Informed consent was obtained for the procedure, including sedation. Risks of perforation, hemorrhage, adverse drug reaction and aspiration were discussed. The patient was placed in the left lateral decubitus position. Patient was explained about the risks and benefits of the procedure. Risks including but not limited to bleeding, infection, and perforation were explained in detail. Also explained about less than 100% sensitivity with the exam and other alternatives. PROCEDURE: EGD DETAILS OF PROCEDURE: Patient was taken to the procedure room where a time out was performed to confirm correct patient and correct procedure. The patient underwent monitored anesthesia care, which was administered by an anesthesia professional. The patient's blood pressure, heart rate, level of consciousness, respirations and oxygen were monitored throughout the procedure. The scope was advanced to the second part of the duodenum. Retroflexion was performed in the fundus. The patient experienced no blood loss. The procedure was not difficult. The patient tolerated the procedure well. There were no apparent adverse events. ANESTHESIA INFORMATION: ASA: II Anesthesia Type: IV Sedation with Anesthesia MEDICATIONS: No administrations occurring from 0738 to 0744 on 07/14/23 FINDINGS: The upper third of the esophagus, middle third of the esophagus and lower third of the esophagus appeared normal. Grade B esophagitis with mucosal breaks measuring 5 mm or more not continuous between folds, covering less than 75% of the circumference in the GE junction The fundus of the stomach, body of the stomach, antrum, duodenal bulb, 1st part of the duodenum and 2nd part of the duodenum appeared normal. Performed random biopsy using biopsy forceps.  SPECIMENS: ID Type Source Tests Collected by Time Destination 1 :  Tissue Duodenum TISSUE EXAM Reba Chavez MD 7/14/2023  7:44 AM  2 :  Tissue Stomach TISSUE EXAM Reba Chavez MD 7/14/2023  7:45 AM      Impression: LA Grade B reflux esophagitis RECOMMENDATION: Continue PPI Reflux precautions Weight loss Await pathology   Reba Chavez MD   Answers for HPI/ROS submitted by the patient on 7/20/2023  Chronicity: recurrent  Onset: more than 1 year ago  Onset quality: undetermined  Frequency: intermittently  Episode duration: 2 Hours  Progression since onset: gradually improving  Pain location: suprapubic region  Pain - numeric: 2/10  Pain quality: cramping  Radiates to: does not radiate  anorexia: No  arthralgias: No  belching: No  constipation: No  diarrhea: Yes  dysuria: No  fever: No  flatus: Yes  frequency: Yes  headaches: No  hematochezia: No  hematuria: No  melena: No  myalgias: No  nausea: Yes  weight loss: No  vomiting: Yes  Aggravated by: certain positions, coughing  Relieved by: belching, bowel movements, passing flatus, vomiting  Diagnostic workup: lower endoscopy, upper endoscopy

## 2023-07-28 DIAGNOSIS — K21.9 GASTROESOPHAGEAL REFLUX DISEASE, UNSPECIFIED WHETHER ESOPHAGITIS PRESENT: ICD-10-CM

## 2023-07-28 RX ORDER — CHOLESTYRAMINE 4 G/9G
POWDER, FOR SUSPENSION ORAL
Qty: 180 PACKET | Refills: 1 | Status: SHIPPED | OUTPATIENT
Start: 2023-07-28

## 2023-11-01 DIAGNOSIS — B35.3 TINEA PEDIS OF BOTH FEET: ICD-10-CM

## 2023-11-01 DIAGNOSIS — R45.4 DIFFICULTY CONTROLLING ANGER: ICD-10-CM

## 2023-11-01 DIAGNOSIS — F41.9 ANXIETY: ICD-10-CM

## 2023-11-01 RX ORDER — KETOCONAZOLE 20 MG/G
CREAM TOPICAL DAILY
Qty: 60 G | Refills: 2 | Status: SHIPPED | OUTPATIENT
Start: 2023-11-01

## 2023-11-27 DIAGNOSIS — L40.9 PSORIASIS: ICD-10-CM

## 2023-11-28 RX ORDER — CLOBETASOL PROPIONATE 0.5 MG/G
CREAM TOPICAL
Qty: 30 G | Refills: 1 | Status: SHIPPED | OUTPATIENT
Start: 2023-11-28

## 2023-12-21 DIAGNOSIS — L40.9 PSORIASIS: ICD-10-CM

## 2023-12-21 RX ORDER — CLOBETASOL PROPIONATE 0.5 MG/G
CREAM TOPICAL
Qty: 90 G | Refills: 1 | Status: SHIPPED | OUTPATIENT
Start: 2023-12-21

## 2024-01-02 ENCOUNTER — TELEPHONE (OUTPATIENT)
Dept: PSYCHIATRY | Facility: CLINIC | Age: 54
End: 2024-01-02

## 2024-01-02 NOTE — TELEPHONE ENCOUNTER
Patient has been added to the Medication Management and Talk Therapy wait list without a referral.    Insurance: [employer] [facility]  Insurance Type:    Commercial []   Medicaid [x]   [facility] (if applicable)   Medicare [x]  Location Preference: [place]  Provider Preference: none  Virtual: Yes [x] No []  Were outside resources sent: Yes [x] No []  Advised the patient to contact his PCP for a referral.

## 2024-01-31 DIAGNOSIS — R45.4 DIFFICULTY CONTROLLING ANGER: ICD-10-CM

## 2024-01-31 DIAGNOSIS — F41.9 ANXIETY: ICD-10-CM

## 2024-06-14 ENCOUNTER — RA CDI HCC (OUTPATIENT)
Dept: OTHER | Facility: HOSPITAL | Age: 54
End: 2024-06-14

## 2024-06-15 DIAGNOSIS — K21.9 GASTROESOPHAGEAL REFLUX DISEASE, UNSPECIFIED WHETHER ESOPHAGITIS PRESENT: ICD-10-CM

## 2024-06-16 RX ORDER — PANTOPRAZOLE SODIUM 40 MG/1
TABLET, DELAYED RELEASE ORAL
Qty: 90 TABLET | Refills: 1 | Status: SHIPPED | OUTPATIENT
Start: 2024-06-16

## 2024-06-21 ENCOUNTER — OFFICE VISIT (OUTPATIENT)
Dept: FAMILY MEDICINE CLINIC | Facility: CLINIC | Age: 54
End: 2024-06-21
Payer: COMMERCIAL

## 2024-06-21 VITALS
WEIGHT: 230 LBS | DIASTOLIC BLOOD PRESSURE: 90 MMHG | HEIGHT: 68 IN | BODY MASS INDEX: 34.86 KG/M2 | OXYGEN SATURATION: 98 % | TEMPERATURE: 97.4 F | HEART RATE: 72 BPM | SYSTOLIC BLOOD PRESSURE: 142 MMHG

## 2024-06-21 DIAGNOSIS — L40.9 PSORIASIS: ICD-10-CM

## 2024-06-21 DIAGNOSIS — Z00.00 ANNUAL PHYSICAL EXAM: Primary | ICD-10-CM

## 2024-06-21 DIAGNOSIS — R35.0 BENIGN PROSTATIC HYPERPLASIA WITH URINARY FREQUENCY: ICD-10-CM

## 2024-06-21 DIAGNOSIS — E78.00 HYPERCHOLESTEROLEMIA: ICD-10-CM

## 2024-06-21 DIAGNOSIS — I10 PRIMARY HYPERTENSION: ICD-10-CM

## 2024-06-21 DIAGNOSIS — N40.1 BENIGN PROSTATIC HYPERPLASIA WITH URINARY FREQUENCY: ICD-10-CM

## 2024-06-21 DIAGNOSIS — Z12.5 SCREENING FOR PROSTATE CANCER: ICD-10-CM

## 2024-06-21 DIAGNOSIS — Z82.49 FAMILY HISTORY OF CORONARY ARTERY DISEASE: ICD-10-CM

## 2024-06-21 PROBLEM — F33.42 RECURRENT MAJOR DEPRESSIVE DISORDER, IN FULL REMISSION (HCC): Status: ACTIVE | Noted: 2021-12-10

## 2024-06-21 PROCEDURE — 99396 PREV VISIT EST AGE 40-64: CPT | Performed by: NURSE PRACTITIONER

## 2024-06-21 RX ORDER — AMLODIPINE BESYLATE 2.5 MG/1
2.5 TABLET ORAL DAILY
Qty: 30 TABLET | Refills: 2 | Status: SHIPPED | OUTPATIENT
Start: 2024-06-21

## 2024-06-21 RX ORDER — TAMSULOSIN HYDROCHLORIDE 0.4 MG/1
0.4 CAPSULE ORAL
Qty: 90 CAPSULE | Refills: 0 | Status: SHIPPED | OUTPATIENT
Start: 2024-06-21

## 2024-06-21 RX ORDER — CLOBETASOL PROPIONATE 0.5 MG/G
1 CREAM TOPICAL 2 TIMES DAILY
Qty: 90 G | Refills: 1 | Status: SHIPPED | OUTPATIENT
Start: 2024-06-21

## 2024-06-21 NOTE — PROGRESS NOTES
Adult Annual Physical  Name: Jacoby Carranza      : 1970      MRN: [id number]  Encounter Provider: AZALIA Huddleston  Encounter Date: 2024   Encounter department: [facility]    Assessment & Plan   1. Annual physical exam  -     Comprehensive metabolic panel; Future  -     Hemoglobin A1C; Future  -     CBC and Platelet; Future  2. Primary hypertension  -     amLODIPine (NORVASC) 2.5 mg tablet; Take 1 tablet (2.5 mg total) by mouth daily  3. Benign prostatic hyperplasia with urinary frequency  -     tamsulosin (FLOMAX) 0.4 mg; Take 1 capsule (0.4 mg total) by mouth daily with dinner  4. Psoriasis  -     clobetasol (TEMOVATE) 0.05 % cream; Apply 5 g (1 Application total) topically 2 (two) times a day To affected area  5. Family history of coronary artery disease  -     CT coronary calcium score; Future; Expected date: 2024  6. Hypercholesterolemia  -     Lipid panel; Future  -     CT coronary calcium score; Future; Expected date: 2024  7. Screening for prostate cancer  -     PSA, Total Screen; Future    #1 Annual physical exam  Discussed with patient plan to order labs to evaluate overall health  #2 Primary hypertension  Discussed with patient plan to start him on amlodipine 2.5 mg daily and recheck pressure in 2 weeks.   #3 Benign prostatic hyperplasia with urinary frequency  Discussed with patient plan to reorder the tamsulosin    #4 Psoriasis  Discussed with patient plan to renew the clobetasol cream  #5 Family history of coronary artery disease  Discussed with patient plan to obtain a CT coronary calcium score   #6 Hypercholesterolemia  Discussed with patient plan to recheck cholesterol levels  Discussed with patient plan to obtain a CT coronary calcium score  #7 Screening for prostate cancer  Discussed with patient plan to order a PSA for screening purposes  #8 Recurrent major depressive disorder, in full remission (HCC)  Patient has started counseling and  stopped sertraline due to it making him to numb  Patient instructed to return in one year or sooner if needed  Patient encouraged to call for problems or concerns in the interim      Immunizations and preventive care screenings were discussed with patient today. Appropriate education was printed on patient's after visit summary.    Counseling:  Alcohol/drug use: discussed moderation in alcohol intake, the recommendations for healthy alcohol use, and avoidance of illicit drug use.  Dental Health: discussed importance of regular tooth brushing, flossing, and dental visits.  Exercise: the importance of regular exercise/physical activity was discussed. Recommend exercise 3-5 times per week for at least 30 minutes.       Depression Screening and Follow-up Plan: Patient was screened for depression during today's encounter. They screened negative with a PHQ-9 score of 0.        History of Present Illness     Adult Annual Physical:  Patient presents for annual physical. Patient here for a comprehensive physical exam. The patient reports that he has been having his blood pressure taken at work with elevated results. He reports that hi systolic numbers have been between 160-200 with headaches occurring intermittently. He reports that his family have problems with heart issues and h does not want to found out he has problems when it is to late. He also reports that he is having some flares of psoriasis and requesting renewal of the clobetasol cream. He also reports having increasing difficulty urinating and requesting renewal of the tamsulosin.   .     Diet and Physical Activity:  - Diet/Nutrition: well balanced diet, consuming 3-5 servings of fruits/vegetables daily and adequate fiber intake.  - Exercise: moderate cardiovascular exercise, 5-7 times a week on average and 30-60 minutes on average.    Depression Screening:    - PHQ-9 Score: 0    General Health:  - Sleep: sleeps well and 7-8 hours of sleep on average.  - Hearing:  normal hearing right ear and normal hearing left ear.  - Vision: no vision problems.  - Dental: regular dental visits and brushes teeth twice daily.     Health:  - History of STDs: no.   - Urinary symptoms: weak urinary stream and straining on urination.     Advanced Care Planning:  - Has an advanced directive?: no    - Has a durable medical POA?: no    - ACP document given to patient?: no      Review of Systems   Constitutional:  Negative for activity change, appetite change and unexpected weight change.   HENT:  Negative for dental problem, ear pain, hearing loss, nosebleeds, sneezing, sore throat, tinnitus and trouble swallowing.    Eyes:  Negative for visual disturbance.   Respiratory:  Negative for cough, chest tightness, shortness of breath and wheezing.    Cardiovascular:  Negative for chest pain, palpitations and leg swelling.   Gastrointestinal:  Negative for abdominal distention, abdominal pain, constipation, diarrhea and nausea.   Endocrine: Negative for polydipsia, polyphagia and polyuria.   Genitourinary:  Positive for difficulty urinating.   Musculoskeletal:  Negative for arthralgias, back pain, myalgias and neck pain.   Skin:  Positive for rash (exzemam on hands, rt ankle, rt temple and feet). Negative for color change.   Allergic/Immunologic: Negative for environmental allergies.   Neurological:  Negative for dizziness, weakness, light-headedness and headaches.   Hematological: Negative.    Psychiatric/Behavioral: Negative.  Negative for dysphoric mood and sleep disturbance. The patient is not nervous/anxious.      Current Outpatient Medications on File Prior to Visit   Medication Sig Dispense Refill   • ketoconazole (NIZORAL) 2 % cream Apply topically daily 60 g 2   • pantoprazole (PROTONIX) 40 mg tablet TAKE 1 TABLET BY MOUTH EVERY DAY 90 tablet 1   • [DISCONTINUED] ARIPiprazole (ABILIFY) 2 mg tablet Take 1 tablet (2 mg total) by mouth daily 30 tablet 3   • [DISCONTINUED] buPROPion (Wellbutrin  "SR) 150 mg 12 hr tablet      • [DISCONTINUED] clobetasol (TEMOVATE) 0.05 % cream APPLY TO AFFECTED AREA TWICE A DAY 90 g 1   • [DISCONTINUED] dicyclomine (BENTYL) 20 mg tablet Take 1 tablet (20 mg total) by mouth every 6 (six) hours as needed (abdominal cramping) 30 tablet 2   • [DISCONTINUED] cholestyramine (QUESTRAN) 4 g packet TAKE 1 PACKET BY MOUTH 2 TIMES A DAY WITH MEALS. (Patient not taking: Reported on 6/21/2024) 180 packet 1   • [DISCONTINUED] sertraline (ZOLOFT) 50 mg tablet TAKE 1 TABLET BY MOUTH EVERY DAY (Patient not taking: Reported on 6/21/2024) 90 tablet 1     No current facility-administered medications on file prior to visit.        Objective     /90 (BP Location: Right arm, Patient Position: Sitting, Cuff Size: Large)   Pulse 72   Temp (!) 97.4 °F (36.3 °C)   Ht 5' 8\" (1.727 m)   Wt 104 kg (230 lb)   SpO2 98%   BMI 34.97 kg/m²     Physical Exam  Vitals reviewed.   Constitutional:       General: He is not in acute distress.     Appearance: He is well-developed and well-groomed. He is not ill-appearing.   HENT:      Head: Normocephalic and atraumatic.      Right Ear: Tympanic membrane, ear canal and external ear normal.      Left Ear: Tympanic membrane, ear canal and external ear normal.      Nose: Nose normal.      Mouth/Throat:      Lips: Pink.      Mouth: Mucous membranes are moist.      Dentition: Normal dentition.      Pharynx: Oropharynx is clear.   Eyes:      General: Lids are normal.      Extraocular Movements: Extraocular movements intact.      Conjunctiva/sclera: Conjunctivae normal.      Pupils: Pupils are equal, round, and reactive to light.   Neck:      Thyroid: No thyromegaly or thyroid tenderness.      Trachea: Trachea and phonation normal.   Cardiovascular:      Rate and Rhythm: Normal rate and regular rhythm.      Pulses:           Radial pulses are 2+ on the right side and 2+ on the left side.        Dorsalis pedis pulses are 2+ on the right side and 2+ on the left " side.        Posterior tibial pulses are 2+ on the right side and 2+ on the left side.      Heart sounds: Normal heart sounds. No murmur heard.  Pulmonary:      Effort: Pulmonary effort is normal.      Breath sounds: Normal breath sounds.   Abdominal:      General: Abdomen is flat. Bowel sounds are normal. There is no distension.      Palpations: Abdomen is soft.      Tenderness: There is no abdominal tenderness.   Musculoskeletal:      Cervical back: Neck supple.      Right lower leg: No edema.      Left lower leg: No edema.   Skin:     General: Skin is warm and dry.      Capillary Refill: Capillary refill takes less than 2 seconds.      Nails: There is no clubbing.   Neurological:      Mental Status: He is alert and oriented to person, place, and time.      Sensory: Sensation is intact.      Motor: Motor function is intact.      Coordination: Coordination is intact.      Deep Tendon Reflexes: Reflexes are normal and symmetric.   Psychiatric:         Mood and Affect: Mood normal.         Speech: Speech normal.         Behavior: Behavior normal. Behavior is cooperative.       Administrative Statements {Disappearing Hyperlinks I  Level of Service * [id number]}

## 2024-07-05 DIAGNOSIS — I10 PRIMARY HYPERTENSION: ICD-10-CM

## 2024-07-05 RX ORDER — AMLODIPINE BESYLATE 2.5 MG/1
2.5 TABLET ORAL DAILY
Qty: 100 TABLET | Refills: 1 | Status: SHIPPED | OUTPATIENT
Start: 2024-07-05

## 2024-07-11 ENCOUNTER — HOSPITAL ENCOUNTER (OUTPATIENT)
Dept: CT IMAGING | Facility: HOSPITAL | Age: 54
Discharge: HOME | End: 2024-07-11
Payer: COMMERCIAL

## 2024-07-11 DIAGNOSIS — E78.00 HYPERCHOLESTEROLEMIA: ICD-10-CM

## 2024-07-11 DIAGNOSIS — Z82.49 FAMILY HISTORY OF CORONARY ARTERY DISEASE: ICD-10-CM

## 2024-07-11 PROCEDURE — 75571 CT HRT W/O DYE W/CA TEST: CPT

## 2024-09-25 ENCOUNTER — TELEPHONE (OUTPATIENT)
Age: 54
End: 2024-09-25

## 2024-11-06 ENCOUNTER — TELEPHONE (OUTPATIENT)
Age: 54
End: 2024-11-06

## 2024-11-06 NOTE — TELEPHONE ENCOUNTER
Contacted pt off [facility] wait list in regards to verify needs of service and offer an appt. When writer asked pt to confirm , pt ended the call.       First Attempt to schedule.

## 2024-11-07 DIAGNOSIS — R35.0 BENIGN PROSTATIC HYPERPLASIA WITH URINARY FREQUENCY: ICD-10-CM

## 2024-11-07 DIAGNOSIS — N40.1 BENIGN PROSTATIC HYPERPLASIA WITH URINARY FREQUENCY: ICD-10-CM

## 2024-11-07 RX ORDER — TAMSULOSIN HYDROCHLORIDE 0.4 MG/1
0.4 CAPSULE ORAL
Qty: 90 CAPSULE | Refills: 3 | Status: SHIPPED | OUTPATIENT
Start: 2024-11-07

## 2024-11-26 ENCOUNTER — TELEPHONE (OUTPATIENT)
Dept: PSYCHIATRY | Facility: CLINIC | Age: 54
End: 2024-11-26

## 2024-11-26 NOTE — TELEPHONE ENCOUNTER
Called pt to see if he was able to move his new pt appt with Mike up to 11 am or any other open spot for that day. Pt has too work and could not move

## 2024-12-06 ENCOUNTER — TELEPHONE (OUTPATIENT)
Dept: PSYCHIATRY | Facility: CLINIC | Age: 54
End: 2024-12-06

## 2024-12-06 NOTE — TELEPHONE ENCOUNTER
Called pt to let him know that Mike would like the first appt to be in person.Pt is now scheduled with Dr. Meyer for 12/20/2024 virtually. Informed pt how to sign the virtual consent forms through [employer]

## 2024-12-16 ENCOUNTER — TELEPHONE (OUTPATIENT)
Age: 54
End: 2024-12-16

## 2024-12-16 NOTE — TELEPHONE ENCOUNTER
Patient is calling regarding cancelling an appointment.    Date/Time: 12/20/24 at 10am    Reason: patient has a conflicting appt that day    Patient was rescheduled: YES [x] NO []  If yes, when was Patient reschedule for: 12/27/24 at 10am    Patient requesting call back to reschedule: YES [] NO [x]

## 2024-12-17 ENCOUNTER — TELEPHONE (OUTPATIENT)
Dept: PSYCHIATRY | Facility: CLINIC | Age: 54
End: 2024-12-17

## 2024-12-17 NOTE — TELEPHONE ENCOUNTER
One week follow up call for New Patient appointment with Dennis Meyer [[id number]] on 12/27/24  was made on 12/6/24. Writer informed patient of New Patient paperwork needing to be completed 5 days prior to the appointment. Writer confirmed paperwork has been sent via My chart.

## 2024-12-20 ENCOUNTER — RESULTS FOLLOW-UP (OUTPATIENT)
Dept: FAMILY MEDICINE CLINIC | Facility: CLINIC | Age: 54
End: 2024-12-20

## 2024-12-20 ENCOUNTER — APPOINTMENT (OUTPATIENT)
Dept: LAB | Facility: MEDICAL CENTER | Age: 54
End: 2024-12-20
Payer: COMMERCIAL

## 2024-12-20 DIAGNOSIS — Z12.5 SCREENING FOR PROSTATE CANCER: ICD-10-CM

## 2024-12-20 DIAGNOSIS — E78.00 HYPERCHOLESTEROLEMIA: ICD-10-CM

## 2024-12-20 DIAGNOSIS — Z00.00 ANNUAL PHYSICAL EXAM: ICD-10-CM

## 2024-12-20 LAB
ALBUMIN SERPL BCG-MCNC: 4.6 G/DL (ref 3.5–5)
ALP SERPL-CCNC: 71 U/L (ref 34–104)
ALT SERPL W P-5'-P-CCNC: 66 U/L (ref 7–52)
ANION GAP SERPL CALCULATED.3IONS-SCNC: 9 MMOL/L (ref 4–13)
AST SERPL W P-5'-P-CCNC: 36 U/L (ref 13–39)
BILIRUB SERPL-MCNC: 0.68 MG/DL (ref 0.2–1)
BUN SERPL-MCNC: 22 MG/DL (ref 5–25)
CALCIUM SERPL-MCNC: 9.8 MG/DL (ref 8.4–10.2)
CHLORIDE SERPL-SCNC: 104 MMOL/L (ref 96–108)
CHOLEST SERPL-MCNC: 210 MG/DL (ref ?–200)
CO2 SERPL-SCNC: 28 MMOL/L (ref 21–32)
CREAT SERPL-MCNC: 0.79 MG/DL (ref 0.6–1.3)
ERYTHROCYTE [DISTWIDTH] IN BLOOD BY AUTOMATED COUNT: 13.6 % (ref 11.6–15.1)
EST. AVERAGE GLUCOSE BLD GHB EST-MCNC: 126 MG/DL
GFR SERPL CREATININE-BSD FRML MDRD: 103 ML/MIN/1.73SQ M
GLUCOSE P FAST SERPL-MCNC: 88 MG/DL (ref 65–99)
HBA1C MFR BLD: 6 %
HCT VFR BLD AUTO: 47.7 % (ref 36.5–49.3)
HDLC SERPL-MCNC: 32 MG/DL
HGB BLD-MCNC: 15.4 G/DL (ref 12–17)
LDLC SERPL CALC-MCNC: 102 MG/DL (ref 0–100)
MCH RBC QN AUTO: 29.1 PG (ref 26.8–34.3)
MCHC RBC AUTO-ENTMCNC: 32.3 G/DL (ref 31.4–37.4)
MCV RBC AUTO: 90 FL (ref 82–98)
NONHDLC SERPL-MCNC: 178 MG/DL
PLATELET # BLD AUTO: 216 THOUSANDS/UL (ref 149–390)
PMV BLD AUTO: 11 FL (ref 8.9–12.7)
POTASSIUM SERPL-SCNC: 4 MMOL/L (ref 3.5–5.3)
PROT SERPL-MCNC: 7.1 G/DL (ref 6.4–8.4)
PSA SERPL-MCNC: 1.59 NG/ML (ref 0–4)
RBC # BLD AUTO: 5.3 MILLION/UL (ref 3.88–5.62)
SODIUM SERPL-SCNC: 141 MMOL/L (ref 135–147)
TRIGL SERPL-MCNC: 382 MG/DL (ref ?–150)
WBC # BLD AUTO: 5.88 THOUSAND/UL (ref 4.31–10.16)

## 2024-12-20 PROCEDURE — 83036 HEMOGLOBIN GLYCOSYLATED A1C: CPT

## 2024-12-20 PROCEDURE — G0103 PSA SCREENING: HCPCS

## 2024-12-20 PROCEDURE — 85027 COMPLETE CBC AUTOMATED: CPT

## 2024-12-20 PROCEDURE — 80053 COMPREHEN METABOLIC PANEL: CPT

## 2024-12-20 PROCEDURE — 80061 LIPID PANEL: CPT

## 2024-12-20 PROCEDURE — 36415 COLL VENOUS BLD VENIPUNCTURE: CPT

## 2024-12-23 ENCOUNTER — TELEPHONE (OUTPATIENT)
Dept: PSYCHIATRY | Facility: CLINIC | Age: 54
End: 2024-12-23

## 2024-12-23 NOTE — TELEPHONE ENCOUNTER
One week follow up call for New Patient appointment with Dennis Meyer [[id number]] on 12/27/24  was made on 12/16/24. Writer informed patient of New Patient paperwork needing to be completed 5 days prior to the appointment. Writer confirmed paperwork has been sent via My chart.

## 2024-12-23 NOTE — TELEPHONE ENCOUNTER
Called and spoke to Jacoby in regards to the virtual care consent form and also the new pt forms we will need signed prior to his visit with DR. Meyer on 12/27/2024. Informed pt to where to find the forms needed. Jacoby understood and said he will do them.

## 2024-12-26 NOTE — TELEPHONE ENCOUNTER
Called patient but had to leave voicemail for patient to sign all consents forms including virtual consent for before patient's virtual appointment with Dr Meyer for 12/27/2024.

## 2024-12-27 ENCOUNTER — TELEMEDICINE (OUTPATIENT)
Dept: PSYCHIATRY | Facility: CLINIC | Age: 54
End: 2024-12-27

## 2024-12-27 ENCOUNTER — TELEPHONE (OUTPATIENT)
Age: 54
End: 2024-12-27

## 2024-12-27 DIAGNOSIS — Z91.199 NO-SHOW FOR APPOINTMENT: Primary | ICD-10-CM

## 2024-12-27 PROCEDURE — PBNCHG PB NO CHARGE PLACEHOLDER: Performed by: STUDENT IN AN ORGANIZED HEALTH CARE EDUCATION/TRAINING PROGRAM

## 2024-12-27 NOTE — PSYCH
Patient did not show for scheduled appointment. Patient to be rescheduled for a later appointment.    Dennis Meyer MD 12/27/24

## 2024-12-27 NOTE — TELEPHONE ENCOUNTER
Patient is calling regarding cancelling an appointment.    Date/Time: 12/27/2024 10am    Reason: lost power    Patient was rescheduled: YES [x] NO []  If yes, when was Patient reschedule for: 1/10/2025 2pm    Patient requesting call back to reschedule: YES [] NO [x]

## 2025-01-06 ENCOUNTER — TELEPHONE (OUTPATIENT)
Dept: PSYCHIATRY | Facility: CLINIC | Age: 55
End: 2025-01-06

## 2025-01-06 NOTE — TELEPHONE ENCOUNTER
Lvm that his appt with Dr. Meyer on 1/10/2025 at 2 pm needs to be r/s. Offered several appts for the week of 1/13/2025. Waiting for pt to call back

## 2025-01-17 ENCOUNTER — TELEMEDICINE (OUTPATIENT)
Dept: PSYCHIATRY | Facility: CLINIC | Age: 55
End: 2025-01-17
Payer: COMMERCIAL

## 2025-01-17 ENCOUNTER — TELEPHONE (OUTPATIENT)
Dept: PSYCHIATRY | Facility: CLINIC | Age: 55
End: 2025-01-17

## 2025-01-17 DIAGNOSIS — F33.0 MAJOR DEPRESSIVE DISORDER, RECURRENT, MILD (HCC): ICD-10-CM

## 2025-01-17 DIAGNOSIS — F41.1 GENERALIZED ANXIETY DISORDER: Primary | ICD-10-CM

## 2025-01-17 PROBLEM — F33.42 RECURRENT MAJOR DEPRESSIVE DISORDER, IN FULL REMISSION (HCC): Status: RESOLVED | Noted: 2021-12-10 | Resolved: 2025-01-17

## 2025-01-17 PROCEDURE — 90792 PSYCH DIAG EVAL W/MED SRVCS: CPT | Performed by: STUDENT IN AN ORGANIZED HEALTH CARE EDUCATION/TRAINING PROGRAM

## 2025-01-17 RX ORDER — ESCITALOPRAM OXALATE 5 MG/1
5 TABLET ORAL DAILY
Qty: 30 TABLET | Refills: 1 | Status: SHIPPED | OUTPATIENT
Start: 2025-01-17
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
## 2025-01-17 NOTE — BH CRISIS PLAN
Client Name: Jacoby Carranza       Client YOB: 1970    [employer] Safety Plan      Creation Date: 1/17/25 Update Date: 1/17/25   Created By: Dennis Meyer MD Last Updated By: Dennis Meyer MD      Step 1: Warning Signs:   Warning Signs   Neglecting personal care   Neglecting responsibilities            Step 2: Internal Coping Strategies:   Internal Coping Strategies   Go for a walk   Taking care of household/yard tasks            Step 3: People and social settings that provide distraction:   Name Contact Information   Anthony (friend) Phone   Candy (friend) Phone            Step 4: People whom I can ask for help during a crisis:      Name Contact Information    Sherita (wife) In home    Krysta Wolff (mother) Phone      Step 5: Professionals or agencies I can contact during a crisis:      [facility]ican/Agency Name Phone Emergency Contact    [facility] [phone]       [facility] Emergency Department Emergency Department Phone Emergency Department Address    [facility] [phone]         Crisis Phone Numbers:   Suicide Prevention Lifeline: Call or Text  010 Crisis Text Line: Text HOME to 840-897   Please note: Some [facility] do not have a separate number for Child/Adolescent specific crisis. If your county is not listed under Child/Adolescent, please call the adult number for your county      Adult Crisis Numbers: Child/Adolescent Crisis Numbers   [facility]: [phone] [facility]: [phone]   [facility]: [phone] [facility]: [phone]   [facility]: [phone] [place]: [phone]   [facility]: [phone] [place]: [phone]   [place]: [phone]   [facility]: [phone]   [facility]: [phone]   [facility]: [phone] (daytime) [phone] (after hours, weekends, holidays)      Step 6: Making the environment safer (plan for lethal means safety):    Patient did not identify any lethal methods: Yes     Optional: What is most important to me and worth living for?   Family     [facility] Safety Plan. Daphnie Nesbitt and Adam Dobbs. Used with permission of the authors.

## 2025-01-17 NOTE — BH TREATMENT PLAN
TREATMENT PLAN (Medication Management Only)        [facility] - [facility]    Name and Date of Birth:  Jacoby Carranza 54 y.o. 1970  Date of Treatment Plan: January 17, 2025  Diagnosis/Diagnoses:    1. Generalized anxiety disorder    2. Major depressive disorder, recurrent, mild (HCC)      Strengths/Personal Resources for Self-Care: supportive family, supportive friends, taking medications as prescribed, ability to adapt to life changes, ability to communicate well, ability to listen, ability to reason, ability to understand psychiatric illness, independence, motivation for treatment, stable employment, willingness to work on problems.  Area/Areas of need (in own words): anxiety symptoms, depressive symptoms, anger control  1. Long Term Goal: improve control of anxiety.  Target Date:6 months - 7/17/2025  Person/Persons responsible for completion of goal: Jacoby  2.  Short Term Objective (s) - How will we reach this goal?:   A. Provider new recommended medication/dosage changes and/or continue medication(s):  Start trial of Lexapro for management of mood and anxiety .  B.  Awaiting individual psychotherapy .  C.  Will work on lifestyle modifications to improve stressors .  Target Date:6 months - 7/17/2025  Person/Persons Responsible for Completion of Goal: Jacoby  Progress Towards Goals: starting treatment  Treatment Modality: medication management every 4 weeks  Review due 180 days from date of this plan: 6 months - 7/17/2025  Expected length of service: ongoing treatment  My Physician/PA/NP and I have developed this plan together and I agree to work on the goals and objectives. I understand the treatment goals that were developed for my treatment.

## 2025-01-17 NOTE — ASSESSMENT & PLAN NOTE
Patient does meet criteria for major depressive disorder based on his history.  His symptoms currently appear to be mild with regard to depression.    See plan for generalized anxiety disorder.

## 2025-01-17 NOTE — ASSESSMENT & PLAN NOTE
The patient's symptoms appear to be consistent with generalized anxiety disorder.  He has had some limited benefit on previous trial of Zoloft, though had cognitive dulling that was intolerable to him and prevented further titration of this.  We will attempt a trial of Lexapro as patient may have benefit with an alternative serotonergic medication.    Start Lexapro 5 mg daily.  Will titrate efficacy as indicated/tolerated.    Continue with individual psychotherapy.  Patient is pending initial psychotherapy evaluation at [facility] and is working to transition his care to [facility].

## 2025-01-31 DIAGNOSIS — F41.1 GENERALIZED ANXIETY DISORDER: ICD-10-CM

## 2025-02-28 ENCOUNTER — OFFICE VISIT (OUTPATIENT)
Dept: BEHAVIORAL/MENTAL HEALTH CLINIC | Facility: CLINIC | Age: 55
End: 2025-02-28
Payer: COMMERCIAL

## 2025-02-28 DIAGNOSIS — F41.1 GENERALIZED ANXIETY DISORDER: ICD-10-CM

## 2025-02-28 DIAGNOSIS — F33.0 MAJOR DEPRESSIVE DISORDER, RECURRENT, MILD (HCC): Primary | ICD-10-CM

## 2025-02-28 PROCEDURE — 90791 PSYCH DIAGNOSTIC EVALUATION: CPT | Performed by: COUNSELOR

## 2025-02-28 NOTE — PSYCH
"Behavioral Health Psychotherapy Progress Note    Psychotherapy Provided: Individual Psychotherapy     1. Major depressive disorder, recurrent, mild (HCC)        2. Generalized anxiety disorder            Goals addressed in session: Goal 1     DATA: Met with new client and evaluated him for Tx. Client reports anxiety and depression which presently emerges as anger. Presently he is focused on his middle child who is not wanting to attend school. He was prescribed Lexapro but had not filled it yet and is looking at the holistic approach but has not taken any supplements as of yet either. He was given the depression screening which shows mild depression. He was provided with Grounding exercises to try until we discuss medication benefits and encouragement to help reduce his anger, anxiety and depression. Client was also encouraged to start a self care program such as exercise or walking since he reports doing nothing to care for himself, mentally or physically.  During this session, this clinician used the following therapeutic modalities: Client-centered Therapy    Substance Abuse was not addressed during this session. If the client is diagnosed with a co-occurring substance use disorder, please indicate any changes in the frequency or amount of use: Stage of change for addressing substance use diagnoses: No substance use/Not applicable    ASSESSMENT:  Jacoby Carranza presents with a Euthymic/ normal mood.     his affect is Normal range and intensity, which is congruent, with his mood and the content of the session. The client has not made progress on their goals.     Jacoby Carranza presents with a none risk of suicide, none risk of self-harm, and none risk of harm to others.    For any risk assessment that surpasses a \"low\" rating, a safety plan must be developed.    A safety plan was indicated: no  If yes, describe in detail N/A    PLAN: Between sessions, Jacoby Carranza will begin grounding. At the next session, the " therapist will use Client-centered Therapy to address self care.    Behavioral Health Treatment Plan and Discharge Planning: Jacoby Carranza is aware of and agrees to continue to work on their treatment plan. They have identified and are working toward their discharge goals. yes    Depression Follow-up Plan Completed: Yes    Visit start and stop times:    02/28/25  Start Time: 1100  Stop Time: 1150  Total Visit Time: 50 minutes
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
## 2025-03-14 DIAGNOSIS — L40.9 PSORIASIS: ICD-10-CM

## 2025-03-14 DIAGNOSIS — K21.9 GASTROESOPHAGEAL REFLUX DISEASE, UNSPECIFIED WHETHER ESOPHAGITIS PRESENT: ICD-10-CM

## 2025-03-14 DIAGNOSIS — N40.1 BENIGN PROSTATIC HYPERPLASIA WITH URINARY FREQUENCY: ICD-10-CM

## 2025-03-14 DIAGNOSIS — B35.3 TINEA PEDIS OF BOTH FEET: ICD-10-CM

## 2025-03-14 DIAGNOSIS — R35.0 BENIGN PROSTATIC HYPERPLASIA WITH URINARY FREQUENCY: ICD-10-CM

## 2025-03-14 RX ORDER — KETOCONAZOLE 20 MG/G
CREAM TOPICAL DAILY
Qty: 60 G | Refills: 0 | Status: SHIPPED | OUTPATIENT
Start: 2025-03-14

## 2025-03-14 RX ORDER — TAMSULOSIN HYDROCHLORIDE 0.4 MG/1
0.4 CAPSULE ORAL
Qty: 90 CAPSULE | Refills: 0 | Status: SHIPPED | OUTPATIENT
Start: 2025-03-14

## 2025-03-14 RX ORDER — PANTOPRAZOLE SODIUM 40 MG/1
40 TABLET, DELAYED RELEASE ORAL DAILY
Qty: 90 TABLET | Refills: 0 | Status: SHIPPED | OUTPATIENT
Start: 2025-03-14

## 2025-03-14 RX ORDER — CLOBETASOL PROPIONATE 0.5 MG/G
1 CREAM TOPICAL 2 TIMES DAILY
Qty: 90 G | Refills: 0 | Status: SHIPPED | OUTPATIENT
Start: 2025-03-14

## 2025-03-20 RX ORDER — ESCITALOPRAM OXALATE 5 MG/1
5 TABLET ORAL DAILY
Qty: 90 TABLET | Refills: 1 | OUTPATIENT
Start: 2025-03-20

## 2025-05-02 ENCOUNTER — SOCIAL WORK (OUTPATIENT)
Dept: BEHAVIORAL/MENTAL HEALTH CLINIC | Facility: CLINIC | Age: 55
End: 2025-05-02
Payer: COMMERCIAL

## 2025-05-02 DIAGNOSIS — F33.0 MAJOR DEPRESSIVE DISORDER, RECURRENT, MILD (HCC): ICD-10-CM

## 2025-05-02 DIAGNOSIS — F41.1 GENERALIZED ANXIETY DISORDER: Primary | ICD-10-CM

## 2025-05-02 PROCEDURE — 90837 PSYTX W PT 60 MINUTES: CPT | Performed by: COUNSELOR

## 2025-05-06 NOTE — PSYCH
"Behavioral Health Psychotherapy Progress Note    Psychotherapy Provided: Individual Psychotherapy     1. Generalized anxiety disorder        2. Major depressive disorder, recurrent, mild (HCC)            Goals addressed in session: Goal 1     DATA: Met with Galindo and processed his anxiety as it relates to his one daughter giving both he and his wife a hard time with going to school. The situation has gotten so bad that they are projecting their frustration onto each other. Their inter-personal communication has always been bad to the degree that he has left the home to stay with his parents. She has again asked him to leave. We processed his language and coping skills in order to attempt to repair the harm  During this session, this clinician used the following therapeutic modalities: Client-centered Therapy    Substance Abuse was not addressed during this session. If the client is diagnosed with a co-occurring substance use disorder, please indicate any changes in the frequency or amount of use: Stage of change for addressing substance use diagnoses: No substance use/Not applicable    ASSESSMENT:  Jacoby Carranza presents with a Euthymic/ normal mood.     his affect is Normal range and intensity, which is congruent, with his mood and the content of the session. The client has made progress on their goals.    Jacoby Carranaz presents with a none risk of suicide, none risk of self-harm, and none risk of harm to others.    For any risk assessment that surpasses a \"low\" rating, a safety plan must be developed.    A safety plan was indicated: no  If yes, describe in detail n/a    PLAN: Between sessions, Jacoby Carranza will attempt to develop better inter-personal communications. At the next session, the therapist will use Client-centered Therapy to address self care.    Behavioral Health Treatment Plan and Discharge Planning: Jacoby Carranza is aware of and agrees to continue to work on their treatment plan. They have " identified and are working toward their discharge goals. yes    Depression Follow-up Plan Completed: No    Visit start and stop times:    05/06/25  Start Time: 1000  Stop Time: 1056  Total Visit Time: 56 minutes

## 2025-06-16 ENCOUNTER — TELEMEDICINE (OUTPATIENT)
Dept: BEHAVIORAL/MENTAL HEALTH CLINIC | Facility: CLINIC | Age: 55
End: 2025-06-16
Payer: COMMERCIAL

## 2025-06-16 DIAGNOSIS — F41.1 GENERALIZED ANXIETY DISORDER: Primary | ICD-10-CM

## 2025-06-16 DIAGNOSIS — N40.1 BENIGN PROSTATIC HYPERPLASIA WITH URINARY FREQUENCY: ICD-10-CM

## 2025-06-16 DIAGNOSIS — K21.9 GASTROESOPHAGEAL REFLUX DISEASE, UNSPECIFIED WHETHER ESOPHAGITIS PRESENT: ICD-10-CM

## 2025-06-16 DIAGNOSIS — F33.0 MAJOR DEPRESSIVE DISORDER, RECURRENT, MILD (HCC): ICD-10-CM

## 2025-06-16 DIAGNOSIS — R35.0 BENIGN PROSTATIC HYPERPLASIA WITH URINARY FREQUENCY: ICD-10-CM

## 2025-06-16 PROCEDURE — 90834 PSYTX W PT 45 MINUTES: CPT | Performed by: COUNSELOR

## 2025-06-16 RX ORDER — TAMSULOSIN HYDROCHLORIDE 0.4 MG/1
0.4 CAPSULE ORAL
Qty: 30 CAPSULE | Refills: 0 | Status: SHIPPED | OUTPATIENT
Start: 2025-06-16

## 2025-06-16 RX ORDER — PANTOPRAZOLE SODIUM 40 MG/1
40 TABLET, DELAYED RELEASE ORAL DAILY
Qty: 90 TABLET | Refills: 0 | Status: SHIPPED | OUTPATIENT
Start: 2025-06-16

## 2025-06-16 NOTE — TELEPHONE ENCOUNTER
Medication refill:    Medication Name: sertraline and bupropion     Medication last refilled: 02/23/2022    Provider: Rubens Muhammad MD     [facility]    Last office visit: 06/11/2021    Follow up: 1 year    Last lab related to medication:     Upcoming appointments: 06/24/2022    Refill outcome: filled for 90 to  allow until  office exam and additional refills will be addressed at office visit       Patient is scheduled for an appointment. Courtesy refill provided.

## 2025-06-23 NOTE — PSYCH
"Virtual Regular VisitName: Jacoby Carranza      : 1970      MRN: [id number]  Encounter Provider: VLADIMIR Jennings  Encounter Date: 2025   Encounter department: [facility] [place]  :  Assessment & Plan  Generalized anxiety disorder         Major depressive disorder, recurrent, mild (HCC)             Goals addressed in session: Goal 1     DATA: Met with Galindo who is reporting that he is getting along much better with his spouse and family. We explored his part he played in this temporary separation. He reports that he is much more at ease and willing to listen along with working through his anxiety without it creating chaos     During this session, this clinician used the following therapeutic modalities: Client-centered Therapy    Substance Abuse was not addressed during this session. If the client is diagnosed with a co-occurring substance use disorder, please indicate any changes in the frequency or amount of use: Stage of change for addressing substance use diagnoses: No substance use/Not applicable    ASSESSMENT:  Jacoby presents with a Euthymic/ normal mood. Jacoby's affect is Normal range and intensity, which is congruent, with their mood and the content of the session. The client has made progress on their goals as evidenced by participation in treatment.    Jacoby presents with a none risk of suicide, none risk of self-harm, and none risk of harm to others.    For any risk assessment that surpasses a \"low\" rating, a safety plan must be developed.    A safety plan was indicated: no  If yes, describe in detail n/a    PLAN: Between sessions, Jacoby will cont to use coping skills. At the next session, the therapist will use Client-centered Therapy to address self care.    Behavioral Health Treatment Plan [facility]: Diagnosis and Treatment Plan explained to Jacoby Jacoby relates understanding diagnosis and is agreeable to Treatment Plan. Yes     Depression Follow-up Plan " Completed: No     Reason for visit is No chief complaint on file.     Recent Visits  Date Type Provider Dept   06/16/25 Telemedicine VLADIMIR Jennings [facility] [place]   Showing recent visits within past 7 days and meeting all other requirements  Future Appointments  No visits were found meeting these conditions.  Showing future appointments within next 150 days and meeting all other requirements     History of Present Illness     HPI    Past Medical History   Past Medical History[1]  Past Surgical History[2]  Current Outpatient Medications   Medication Instructions    amLODIPine (NORVASC) 2.5 mg, Oral, Daily    clobetasol (TEMOVATE) 0.05 % cream 1 Application, Topical, 2 times daily, To affected area    escitalopram (LEXAPRO) 5 mg, Oral, Daily    ketoconazole (NIZORAL) 2 % cream Topical, Daily    pantoprazole (PROTONIX) 40 mg, Oral, Daily    tamsulosin (FLOMAX) 0.4 mg, Oral, Daily with dinner     Allergies[3]    Objective   There were no vitals taken for this visit.    Video Exam  Physical Exam     Administrative Statements   Encounter provider VLADIMIR Jennings    The Patient is located at Home and in the following state in which I hold an active license [place].    The patient was identified by name and date of birth. Jacoby Carranza was informed that this is a telemedicine visit and that the visit is being conducted through the Epic Embedded platform. He agrees to proceed..  My office door was closed. No one else was in the room.  He acknowledged consent and understanding of privacy and security of the video platform. The patient has agreed to participate and understands they can discontinue the visit at any time.    I have spent a total time of 45 minutes in caring for this patient on the day of the visit/encounter including Counseling / Coordination of care, not including the time spent for establishing the audio/video connection.    Visit Time  Start Time: 1800  Stop Time: 1845  Total Visit  Time: 45 minutes       [1]   Past Medical History:  Diagnosis Date    Benign prostatic hyperplasia (BPH) with straining on urination     GERD (gastroesophageal reflux disease)    [2]   Past Surgical History:  Procedure Laterality Date    TOTAL HIP ARTHROPLASTY Right    [3]   Allergies  Allergen Reactions    Soybean Oil - Food Allergy GI Intolerance    Sweet Corn (Diagnostic) - Food Allergy GI Intolerance    Wheat Bran - Food Allergy GI Intolerance

## 2025-06-27 ENCOUNTER — OFFICE VISIT (OUTPATIENT)
Dept: FAMILY MEDICINE CLINIC | Facility: CLINIC | Age: 55
End: 2025-06-27
Payer: COMMERCIAL

## 2025-06-27 VITALS
SYSTOLIC BLOOD PRESSURE: 140 MMHG | WEIGHT: 231 LBS | TEMPERATURE: 97.9 F | DIASTOLIC BLOOD PRESSURE: 90 MMHG | HEART RATE: 60 BPM | HEIGHT: 68 IN | BODY MASS INDEX: 35.01 KG/M2 | OXYGEN SATURATION: 99 %

## 2025-06-27 DIAGNOSIS — E78.00 HYPERCHOLESTEROLEMIA: ICD-10-CM

## 2025-06-27 DIAGNOSIS — F33.0 MAJOR DEPRESSIVE DISORDER, RECURRENT, MILD (HCC): ICD-10-CM

## 2025-06-27 DIAGNOSIS — Z00.00 PHYSICAL EXAM, ANNUAL: Primary | ICD-10-CM

## 2025-06-27 DIAGNOSIS — N40.1 BENIGN PROSTATIC HYPERPLASIA WITH URINARY FREQUENCY: ICD-10-CM

## 2025-06-27 DIAGNOSIS — E78.1 FAMILIAL HYPERTRIGLYCERIDEMIA: ICD-10-CM

## 2025-06-27 DIAGNOSIS — R35.0 BENIGN PROSTATIC HYPERPLASIA WITH URINARY FREQUENCY: ICD-10-CM

## 2025-06-27 PROCEDURE — 99396 PREV VISIT EST AGE 40-64: CPT | Performed by: NURSE PRACTITIONER

## 2025-06-27 NOTE — PROGRESS NOTES
Adult Annual Physical  Name: Jacoby Carranza      : 1970      MRN: [id number]  Encounter Provider: AZALIA Huddleston  Encounter Date: 2025   Encounter department: [facility]    :  Assessment & Plan  Physical exam, annual    Orders:  •  Comprehensive metabolic panel; Future  •  Hemoglobin A1C; Future  •  Lipid panel; Future    Major depressive disorder, recurrent, mild (HCC)           Benign prostatic hyperplasia with urinary frequency         Hypercholesterolemia         Familial hypertriglyceridemia             Preventive Screenings:  - Diabetes Screening: risks/benefits discussed  - Cholesterol Screening: screening not indicated, has hyperlipidemia and risks/benefits discussed   - Hepatitis C screening: patient declines   - HIV screening: patient declines   - Colon cancer screening: screening up-to-date   - Lung cancer screening: screening not indicated   - Prostate cancer screening: screening up-to-date     Immunizations:  - Immunizations due: Prevnar 20, Tdap and Zoster (Shingrix)    Counseling/Anticipatory Guidance:  - Alcohol: discussed moderation in alcohol intake and recommendations for healthy alcohol use.   - Dental health: discussed importance of regular tooth brushing, flossing, and dental visits.   - Sexual health: discussed sexually transmitted diseases, partner selection, use of condoms, avoidance of unintended pregnancy, and contraceptive alternatives.   - Diet: discussed recommendations for a healthy/well-balanced diet.   - Exercise: the importance of regular exercise/physical activity was discussed. Recommend exercise 3-5 times per week for at least 30 minutes.   - Injury prevention: discussed safety/seat belts, safety helmets, smoke detectors, carbon monoxide detectors, and smoking near bedding or upholstery.       Depression Screening and Follow-up Plan: Patient was screened for depression during today's encounter. They screened negative with a PHQ-9 score  of 1.          History of Present Illness     Adult Annual Physical:  Patient presents for annual physical. Patient here for a comprehensive physical exam. The patient reports that he did not start using amlodipine since ordered and stopped Lexapro because was feeling better. He does not have interested to go back on either medication at current time.He reports that he is working through some of his emotional issues and seeing a counselor. His wife is going to start him on a new intermittent fasting with supplement diet so discussed obtaining lab work in 3 months after diet changes.  .     Diet and Physical Activity:  - Diet/Nutrition: no special diet.  - Exercise: no formal exercise.    Depression Screening:    - PHQ-9 Score: 1    General Health:  - Sleep: snores loudly and witnessed apnea.  - Hearing: tinnitus and normal hearing bilateral ears.  - Vision: wears glasses, vision problems and most recent eye exam < 1 year ago.  - Dental: regular dental visits, brushes teeth twice daily and floss regularly.     Health:  - History of STDs: no.   - Urinary symptoms: none.     Advanced Care Planning:  - Has an advanced directive?: no    - Has a durable medical POA?: no    - ACP document given to patient?: no      Review of Systems   Constitutional: Negative.  Negative for activity change, appetite change and unexpected weight change.   HENT:  Negative for dental problem, ear pain, hearing loss, nosebleeds, sneezing, sore throat, tinnitus and trouble swallowing.    Eyes:  Negative for visual disturbance.   Respiratory:  Negative for cough, chest tightness, shortness of breath and wheezing.    Cardiovascular:  Negative for chest pain, palpitations and leg swelling.   Gastrointestinal:  Negative for abdominal distention, abdominal pain, constipation, diarrhea and nausea.   Endocrine: Negative for polydipsia, polyphagia and polyuria.   Genitourinary: Negative.    Musculoskeletal:  Negative for arthralgias, back pain,  "myalgias and neck pain.   Skin:  Negative for color change and rash.   Allergic/Immunologic: Negative for environmental allergies.   Neurological:  Negative for dizziness, weakness, light-headedness and headaches.   Hematological: Negative.    Psychiatric/Behavioral: Negative.  Negative for dysphoric mood and sleep disturbance. The patient is not nervous/anxious.      Medications Ordered Prior to Encounter[1]     Objective   /90   Pulse 60   Temp 97.9 °F (36.6 °C)   Ht 5' 8\" (1.727 m)   Wt 105 kg (231 lb)   SpO2 99%   BMI 35.12 kg/m² (Reviewed)    Physical Exam  Vitals reviewed.   Constitutional:       General: He is not in acute distress.     Appearance: He is well-developed and well-groomed. He is not ill-appearing.   HENT:      Head: Normocephalic and atraumatic.      Right Ear: External ear normal.      Left Ear: External ear normal.      Nose: Nose normal.      Mouth/Throat:      Lips: Pink.      Mouth: Mucous membranes are moist.      Dentition: Normal dentition.      Pharynx: Oropharynx is clear.     Eyes:      General: Lids are normal.      Extraocular Movements: Extraocular movements intact.      Conjunctiva/sclera: Conjunctivae normal.      Pupils: Pupils are equal, round, and reactive to light.     Neck:      Thyroid: No thyromegaly or thyroid tenderness.      Trachea: Trachea and phonation normal.     Cardiovascular:      Rate and Rhythm: Normal rate and regular rhythm.      Pulses:           Radial pulses are 2+ on the right side and 2+ on the left side.        Dorsalis pedis pulses are 2+ on the right side and 2+ on the left side.        Posterior tibial pulses are 2+ on the right side and 2+ on the left side.      Heart sounds: Normal heart sounds. No murmur heard.  Pulmonary:      Effort: Pulmonary effort is normal.      Breath sounds: Normal breath sounds.   Abdominal:      General: Abdomen is flat. Bowel sounds are normal. There is no distension.      Palpations: Abdomen is soft.      " Tenderness: There is no abdominal tenderness.     Musculoskeletal:      Cervical back: Neck supple.      Right lower leg: No edema.      Left lower leg: No edema.     Skin:     General: Skin is warm and dry.      Capillary Refill: Capillary refill takes less than 2 seconds.     Neurological:      General: No focal deficit present.      Mental Status: He is alert and oriented to person, place, and time.      Cranial Nerves: Cranial nerves 2-12 are intact.      Coordination: Coordination is intact.     Psychiatric:         Mood and Affect: Mood normal.         Speech: Speech normal.         Behavior: Behavior normal. Behavior is cooperative.                [1]  Current Outpatient Medications on File Prior to Visit   Medication Sig Dispense Refill   • clobetasol (TEMOVATE) 0.05 % cream Apply 5 g (1 Application total) topically 2 (two) times a day To affected area 90 g 0   • ketoconazole (NIZORAL) 2 % cream Apply topically daily 60 g 0   • pantoprazole (PROTONIX) 40 mg tablet TAKE 1 TABLET BY MOUTH EVERY DAY 90 tablet 0   • tamsulosin (FLOMAX) 0.4 mg TAKE 1 CAPSULE BY MOUTH EVERY DAY WITH DINNER 30 capsule 0   • [DISCONTINUED] amLODIPine (NORVASC) 2.5 mg tablet TAKE 1 TABLET BY MOUTH EVERY  tablet 1   • [DISCONTINUED] escitalopram (LEXAPRO) 5 mg tablet Take 1 tablet (5 mg total) by mouth daily 30 tablet 1     No current facility-administered medications on file prior to visit.   [facility] ([facility]  [id number]

## 2025-07-14 ENCOUNTER — TELEMEDICINE (OUTPATIENT)
Dept: BEHAVIORAL/MENTAL HEALTH CLINIC | Facility: CLINIC | Age: 55
End: 2025-07-14
Payer: COMMERCIAL

## 2025-07-14 DIAGNOSIS — F41.1 GENERALIZED ANXIETY DISORDER: Primary | ICD-10-CM

## 2025-07-14 PROCEDURE — 90834 PSYTX W PT 45 MINUTES: CPT | Performed by: COUNSELOR

## 2025-07-20 NOTE — PSYCH
"Virtual Regular VisitName: Jacoby Carranza      : 1970      MRN: [id number]  Encounter Provider: VLADIMIR Jennings  Encounter Date: 2025   Encounter department: [facility] [place]  :  Assessment & Plan  Generalized anxiety disorder               Goals addressed in session: Goal 1     DATA: Met with  Galindo who reports that the relationship between he and his family is greatly improving to the degree that there are little to no fighting or disagreements within the family. We continue to explore coping skills and how he is using them in order to keep his anxiety down   During this session, this clinician used the following therapeutic modalities: Client-centered Therapy    Substance Abuse was not addressed during this session. If the client is diagnosed with a co-occurring substance use disorder, please indicate any changes in the frequency or amount of use:  Stage of change for addressing substance use diagnoses: No substance use/Not applicable    ASSESSMENT:  Jacoby presents with a Euthymic/ normal mood. Jacoby's affect is Normal range and intensity, which is congruent, with their mood and the content of the session. The client has made progress on their goals as evidenced by participation in therapy.    Jacoby presents with a none risk of suicide, none risk of self-harm, and none risk of harm to others.    For any risk assessment that surpasses a \"low\" rating, a safety plan must be developed.    A safety plan was indicated: no  If yes, describe in detail n/a    PLAN: Between sessions, Jacoby will cont. To use coping skills . At the next session, the therapist will use Client-centered Therapy to address self care.    Behavioral Health Treatment Plan [facility]: Diagnosis and Treatment Plan explained to Jacoby, Jacoby relates understanding diagnosis and is agreeable to Treatment Plan. Yes     Depression Follow-up Plan Completed: No     Reason for visit is   Chief Complaint "   Patient presents with    Virtual Regular Visit      Recent Visits  Date Type Provider Dept   07/14/25 Telemedicine VLADIMIR Jennings [facility] [place]   Showing recent visits within past 7 days and meeting all other requirements  Future Appointments  No visits were found meeting these conditions.  Showing future appointments within next 150 days and meeting all other requirements     History of Present Illness     HPI    Past Medical History   Past Medical History:   Diagnosis Date    Anxiety     Benign prostatic hyperplasia (BPH) with straining on urination     Chronic pain disorder     Depression     GERD (gastroesophageal reflux disease)      Past Surgical History:   Procedure Laterality Date    JOINT REPLACEMENT  08/30/2016    TOTAL HIP ARTHROPLASTY Right     VASECTOMY  11/14/2019     Current Outpatient Medications   Medication Instructions    clobetasol (TEMOVATE) 0.05 % cream 1 Application, Topical, 2 times daily, To affected area    ketoconazole (NIZORAL) 2 % cream Topical, Daily    pantoprazole (PROTONIX) 40 mg, Oral, Daily    tamsulosin (FLOMAX) 0.4 mg, Oral, Daily with dinner     Allergies   Allergen Reactions    Soybean Oil - Food Allergy GI Intolerance    Sweet Corn (Diagnostic) - Food Allergy GI Intolerance    Wheat Bran - Food Allergy GI Intolerance       Objective   There were no vitals taken for this visit.    Video Exam  Physical Exam     Administrative Statements   Encounter provider VLADIMIR Jennings    The Patient is located at Home and in the following state in which I hold an active license [place].    The patient was identified by name and date of birth. Jacoby Carranza was informed that this is a telemedicine visit and that the visit is being conducted through the Epic Embedded platform. He agrees to proceed..  My office door was closed. No one else was in the room.  He acknowledged consent and understanding of privacy and security of the video platform. The patient has  agreed to participate and understands they can discontinue the visit at any time.    I have spent a total time of 45 minutes in caring for this patient on the day of the visit/encounter including Counseling / Coordination of care, not including the time spent for establishing the audio/video connection.    Visit Time  Start Time: 1800  Stop Time: 1845  Total Visit Time: 45 minutes

## 2025-08-03 DIAGNOSIS — N40.1 BENIGN PROSTATIC HYPERPLASIA WITH URINARY FREQUENCY: ICD-10-CM

## 2025-08-03 DIAGNOSIS — R35.0 BENIGN PROSTATIC HYPERPLASIA WITH URINARY FREQUENCY: ICD-10-CM

## 2025-08-04 RX ORDER — TAMSULOSIN HYDROCHLORIDE 0.4 MG/1
0.4 CAPSULE ORAL
Qty: 90 CAPSULE | Refills: 1 | Status: SHIPPED | OUTPATIENT
Start: 2025-08-04